# Patient Record
Sex: FEMALE | Race: BLACK OR AFRICAN AMERICAN | NOT HISPANIC OR LATINO | Employment: UNEMPLOYED
[De-identification: names, ages, dates, MRNs, and addresses within clinical notes are randomized per-mention and may not be internally consistent; named-entity substitution may affect disease eponyms.]

---

## 2017-12-17 ENCOUNTER — HEALTH MAINTENANCE LETTER (OUTPATIENT)
Age: 57
End: 2017-12-17

## 2023-11-02 ENCOUNTER — TRANSFERRED RECORDS (OUTPATIENT)
Dept: HEALTH INFORMATION MANAGEMENT | Facility: CLINIC | Age: 63
End: 2023-11-02
Payer: MEDICAID

## 2023-11-16 ENCOUNTER — PATIENT OUTREACH (OUTPATIENT)
Dept: CARE COORDINATION | Facility: CLINIC | Age: 63
End: 2023-11-16

## 2023-11-16 ENCOUNTER — OFFICE VISIT (OUTPATIENT)
Dept: FAMILY MEDICINE | Facility: CLINIC | Age: 63
End: 2023-11-16
Payer: MEDICAID

## 2023-11-16 VITALS
BODY MASS INDEX: 39.89 KG/M2 | TEMPERATURE: 98.1 F | OXYGEN SATURATION: 99 % | DIASTOLIC BLOOD PRESSURE: 60 MMHG | WEIGHT: 254.12 LBS | RESPIRATION RATE: 16 BRPM | SYSTOLIC BLOOD PRESSURE: 120 MMHG | HEIGHT: 67 IN | HEART RATE: 69 BPM

## 2023-11-16 DIAGNOSIS — N39.46 MIXED STRESS AND URGE URINARY INCONTINENCE: ICD-10-CM

## 2023-11-16 DIAGNOSIS — I10 ESSENTIAL HYPERTENSION: Primary | ICD-10-CM

## 2023-11-16 DIAGNOSIS — N18.32 STAGE 3B CHRONIC KIDNEY DISEASE (CKD) (H): ICD-10-CM

## 2023-11-16 DIAGNOSIS — Z11.4 SCREENING FOR HIV (HUMAN IMMUNODEFICIENCY VIRUS): ICD-10-CM

## 2023-11-16 DIAGNOSIS — M17.0 ARTHRITIS OF BOTH KNEES: ICD-10-CM

## 2023-11-16 DIAGNOSIS — Z13.9 ENCOUNTER FOR SCREENING INVOLVING SOCIAL DETERMINANTS OF HEALTH (SDOH): ICD-10-CM

## 2023-11-16 DIAGNOSIS — N95.0 POSTMENOPAUSAL BLEEDING: ICD-10-CM

## 2023-11-16 DIAGNOSIS — E66.01 CLASS 3 SEVERE OBESITY DUE TO EXCESS CALORIES WITHOUT SERIOUS COMORBIDITY WITH BODY MASS INDEX (BMI) OF 40.0 TO 44.9 IN ADULT (H): ICD-10-CM

## 2023-11-16 DIAGNOSIS — E66.813 CLASS 3 SEVERE OBESITY DUE TO EXCESS CALORIES WITHOUT SERIOUS COMORBIDITY WITH BODY MASS INDEX (BMI) OF 40.0 TO 44.9 IN ADULT (H): ICD-10-CM

## 2023-11-16 DIAGNOSIS — Z23 ENCOUNTER FOR IMMUNIZATION: ICD-10-CM

## 2023-11-16 DIAGNOSIS — Z86.73 HISTORY OF STROKE: ICD-10-CM

## 2023-11-16 LAB
ALBUMIN MFR UR ELPH: 25 MG/DL
ALBUMIN SERPL BCG-MCNC: 3.8 G/DL (ref 3.5–5.2)
ALBUMIN UR-MCNC: 30 MG/DL
ALP SERPL-CCNC: 95 U/L (ref 40–150)
ALT SERPL W P-5'-P-CCNC: 10 U/L (ref 0–50)
ANION GAP SERPL CALCULATED.3IONS-SCNC: 14 MMOL/L (ref 7–15)
APPEARANCE UR: CLEAR
AST SERPL W P-5'-P-CCNC: 14 U/L (ref 0–45)
BACTERIA #/AREA URNS HPF: ABNORMAL /HPF
BILIRUB SERPL-MCNC: 0.6 MG/DL
BILIRUB UR QL STRIP: NEGATIVE
BUN SERPL-MCNC: 30.4 MG/DL (ref 8–23)
CALCIUM SERPL-MCNC: 9.4 MG/DL (ref 8.8–10.2)
CHLORIDE SERPL-SCNC: 103 MMOL/L (ref 98–107)
CHOLEST SERPL-MCNC: 161 MG/DL
COLOR UR AUTO: YELLOW
CREAT SERPL-MCNC: 1.95 MG/DL (ref 0.51–0.95)
CREAT UR-MCNC: 131 MG/DL
CREAT UR-MCNC: 131 MG/DL
DEPRECATED HCO3 PLAS-SCNC: 22 MMOL/L (ref 22–29)
EGFRCR SERPLBLD CKD-EPI 2021: 28 ML/MIN/1.73M2
ERYTHROCYTE [DISTWIDTH] IN BLOOD BY AUTOMATED COUNT: 12.5 % (ref 10–15)
FERRITIN SERPL-MCNC: 138 NG/ML (ref 11–328)
GLUCOSE SERPL-MCNC: 118 MG/DL (ref 70–99)
GLUCOSE UR STRIP-MCNC: NEGATIVE MG/DL
HBA1C MFR BLD: 5.9 % (ref 0–5.6)
HCT VFR BLD AUTO: 37.3 % (ref 35–47)
HDLC SERPL-MCNC: 43 MG/DL
HGB BLD-MCNC: 12.3 G/DL (ref 11.7–15.7)
HGB UR QL STRIP: ABNORMAL
HOLD SPECIMEN: NORMAL
IRON BINDING CAPACITY (ROCHE): 239 UG/DL (ref 240–430)
IRON SATN MFR SERPL: 21 % (ref 15–46)
IRON SERPL-MCNC: 50 UG/DL (ref 37–145)
KETONES UR STRIP-MCNC: NEGATIVE MG/DL
LDLC SERPL CALC-MCNC: 101 MG/DL
LEUKOCYTE ESTERASE UR QL STRIP: ABNORMAL
MCH RBC QN AUTO: 29.4 PG (ref 26.5–33)
MCHC RBC AUTO-ENTMCNC: 33 G/DL (ref 31.5–36.5)
MCV RBC AUTO: 89 FL (ref 78–100)
MICROALBUMIN UR-MCNC: 88.7 MG/L
MICROALBUMIN/CREAT UR: 67.71 MG/G CR (ref 0–25)
MUCOUS THREADS #/AREA URNS LPF: PRESENT /LPF
NITRATE UR QL: NEGATIVE
NONHDLC SERPL-MCNC: 118 MG/DL
PH UR STRIP: 5.5 [PH] (ref 5–7)
PLATELET # BLD AUTO: 259 10E3/UL (ref 150–450)
POTASSIUM SERPL-SCNC: 3.9 MMOL/L (ref 3.4–5.3)
PROT SERPL-MCNC: 7.4 G/DL (ref 6.4–8.3)
PROT/CREAT 24H UR: 0.19 MG/MG CR (ref 0–0.2)
RBC # BLD AUTO: 4.19 10E6/UL (ref 3.8–5.2)
RBC #/AREA URNS AUTO: ABNORMAL /HPF
SODIUM SERPL-SCNC: 139 MMOL/L (ref 135–145)
SP GR UR STRIP: 1.01 (ref 1–1.03)
SQUAMOUS #/AREA URNS AUTO: ABNORMAL /LPF
TRIGL SERPL-MCNC: 86 MG/DL
UROBILINOGEN UR STRIP-ACNC: 0.2 E.U./DL
WBC # BLD AUTO: 5.6 10E3/UL (ref 4–11)
WBC #/AREA URNS AUTO: ABNORMAL /HPF
WBC CLUMPS #/AREA URNS HPF: PRESENT /HPF

## 2023-11-16 PROCEDURE — 80061 LIPID PANEL: CPT | Performed by: FAMILY MEDICINE

## 2023-11-16 PROCEDURE — 83970 ASSAY OF PARATHORMONE: CPT | Performed by: FAMILY MEDICINE

## 2023-11-16 PROCEDURE — 83540 ASSAY OF IRON: CPT | Performed by: FAMILY MEDICINE

## 2023-11-16 PROCEDURE — 82728 ASSAY OF FERRITIN: CPT | Performed by: FAMILY MEDICINE

## 2023-11-16 PROCEDURE — 82043 UR ALBUMIN QUANTITATIVE: CPT | Performed by: FAMILY MEDICINE

## 2023-11-16 PROCEDURE — 81001 URINALYSIS AUTO W/SCOPE: CPT | Performed by: FAMILY MEDICINE

## 2023-11-16 PROCEDURE — 99204 OFFICE O/P NEW MOD 45 MIN: CPT | Mod: 25 | Performed by: FAMILY MEDICINE

## 2023-11-16 PROCEDURE — 83550 IRON BINDING TEST: CPT | Performed by: FAMILY MEDICINE

## 2023-11-16 PROCEDURE — 82306 VITAMIN D 25 HYDROXY: CPT | Performed by: FAMILY MEDICINE

## 2023-11-16 PROCEDURE — 82570 ASSAY OF URINE CREATININE: CPT | Performed by: FAMILY MEDICINE

## 2023-11-16 PROCEDURE — 90471 IMMUNIZATION ADMIN: CPT | Performed by: FAMILY MEDICINE

## 2023-11-16 PROCEDURE — 84156 ASSAY OF PROTEIN URINE: CPT | Performed by: FAMILY MEDICINE

## 2023-11-16 PROCEDURE — 83735 ASSAY OF MAGNESIUM: CPT | Performed by: FAMILY MEDICINE

## 2023-11-16 PROCEDURE — 90662 IIV NO PRSV INCREASED AG IM: CPT | Performed by: FAMILY MEDICINE

## 2023-11-16 PROCEDURE — 85027 COMPLETE CBC AUTOMATED: CPT | Performed by: FAMILY MEDICINE

## 2023-11-16 PROCEDURE — 36415 COLL VENOUS BLD VENIPUNCTURE: CPT | Performed by: FAMILY MEDICINE

## 2023-11-16 PROCEDURE — 80053 COMPREHEN METABOLIC PANEL: CPT | Performed by: FAMILY MEDICINE

## 2023-11-16 PROCEDURE — 83036 HEMOGLOBIN GLYCOSYLATED A1C: CPT | Performed by: FAMILY MEDICINE

## 2023-11-16 PROCEDURE — 84100 ASSAY OF PHOSPHORUS: CPT | Performed by: FAMILY MEDICINE

## 2023-11-16 RX ORDER — ACETAMINOPHEN AND CODEINE PHOSPHATE 120; 12 MG/5ML; MG/5ML
1 SOLUTION ORAL
COMMUNITY
Start: 2023-10-26 | End: 2023-12-21

## 2023-11-16 RX ORDER — LOSARTAN POTASSIUM 25 MG/1
50 TABLET ORAL DAILY
Qty: 90 TABLET | Refills: 1 | Status: SHIPPED | OUTPATIENT
Start: 2023-11-16 | End: 2023-12-18

## 2023-11-16 RX ORDER — CANDESARTAN CILEXETIL AND HYDROCHLOROTHIAZIDE 16; 12.5 MG/1; MG/1
2 TABLET ORAL DAILY
COMMUNITY
End: 2023-11-16

## 2023-11-16 RX ORDER — RESPIRATORY SYNCYTIAL VIRUS VACCINE 120MCG/0.5
0.5 KIT INTRAMUSCULAR ONCE
Qty: 1 EACH | Refills: 0 | Status: CANCELLED | OUTPATIENT
Start: 2023-11-16 | End: 2023-11-16

## 2023-11-16 NOTE — COMMUNITY RESOURCES LIST (ENGLISH)
11/16/2023   [facility]  N/A  For questions about this resource list or additional care needs, please contact your primary care clinic or care manager.  Phone: [phone]   Email: N/A   Address: [address] [place] [address]   Hours: N/A        Financial Stability       Rent and mortgage payment assistance  1  [employer] (CLUES) [facility] - Rent Assistance Hotline Distance: 1.22 miles      Phone/Virtual   [address] [place] [address]  Language: English, Hebrew  Hours: Mon - Fri 8:30 AM - 5:00 PM   Phone: (105) 314-8656 Email: info@[employer].org Website: http://www.[employer].org/     2  [facility] Distance: 1.26 miles      In-Person   [address] [place] [address]  Language: English  Hours: Mon - Fri 8:00 AM - 3:00 PM  Fees: Free   Phone: (638) 825-3781 Ext.14 Email: neighborhood@[facility] Website: http://www.[facility].org          Hotlines and Helplines       Hotline - Housing crisis  3  [facility] Distance: 0.83 miles      Phone/Virtual   [address] [place] [address]  Language: English  Hours: Mon - Sun Open 24 Hours   Phone: (683) 799-5774 Email: communications@[facility].org Website: https://[facility]-mn.org/oursaviourshousing/     4  [facility] Distance: 1.56 miles      Phone/Virtual   [address] [place] [address]  Language: English  Hours: Mon - Sun Open 24 Hours  Fees: Free   Phone: (962) 588-2404 Email: info@saintola.org Website: http://www.saintola.org/          Housing       Coordinated Entry access point  5  [facility] [employer] [facility] [place] ([place] [facility] - Housing Services Distance: 1.02 miles      In-Person   [address] [place] [address]  Language: English  Hours: Mon - Fri 9:00 AM - 5:00 PM  Fees: Free   Phone: (340) 438-3834 Email: housing@[facility].org Website: http://www.[facility].org/housing     6  [facility] - Adult Shelter Connect  [facility] Distance: 1.56 miles      In-Person   [address] [place] [address]  Language: English  Hours: Mon - Sat 10:00 PM - 5:00 PM  Fees: Free   Phone: (675) 844-8270 Email: info@[employer]on[employer].com Website: http://www.saintolafVonvo.com/     Drop-in center or day shelter  7  [facility] Distance: 0.96 miles      In-Person   [address], [place] [address]  Language: English, Belizean, Slovenian  Hours: Mon - Sat 7:00 AM - 3:00 PM  Fees: Free, Self Pay   Phone: (475) 852-3592 Email: info@[employer] Website: https://www.[employer]/locations/opportunity-center/     8  [facility] Distance: 1.15 miles      In-Person   [address] [place] [address]  Language: English  Hours: Mon - Fri 12:00 PM - 3:00 PM  Fees: Free   Phone: (408) 640-9632 Email: [facility]@[employer] Website: http://[facility].Simplify/     Housing search assistance  9  [facility] [employer] [facility] ([facility]) Distance: 0.45 miles      In-Person   [address] [place] [address]  Language: English, Belizean, Slovenian  Hours: Mon - Fri 8:30 AM - 4:30 PM  Fees: Free   Phone: (792) 963-8230 Email: luisa@[facility]mn.org Website: http://www.[facility]-mn.org/     10  [facility]  - Office of Multicultural Services Distance: 0.89 miles      Phone/Virtual   [address] [place] [address]  Language: American Sign Language, Kiswahili, Indonesian, English, Ukrainian, Portuguese, Oromo, Cambodian, Belizean, Slovenian, Swahili, Gibraltarian, Haitian  Hours: Mon - Tue 9:00 AM - 4:00 PM , Wed 10:00 AM - 5:00 PM , Thu - Fri 9:00 AM - 4:00 PM  Fees: Free   Phone: (414) 298-4410 Email: david@[employer]. Website: http://www.[employer]./residents/human-services/multi-cultural-services     Shelter for families  11  [facility] Distance: 18.35 miles      In-Person   [address] [facility] MIA Jenkins [address]  Language: English   Hours: Mon - Fri 3:00 PM - 9:00 AM , Sat - Sun Open 24 Hours  Fees: Free   Phone: (795) 811-6243 Ext.1 Website: https://www.[facility]rews.[employer]/2020/07/03/emergency-family-shelter/     Shelter for individuals  12  [facility] Distance: 0.83 miles      In-Person   [address] [place] [address]  Language: English  Hours: Mon - Sun Open 24 Hours  Fees: Free   Phone: (444) 831-8541 Email: communications@[facility].org Website: https://[facility] Biologicsmn.org/oursaviourshousing/     13  [facility] Distance: 2.09 miles      In-Person   [address] [place] [address]  Language: English  Hours: Mon - Fri 4:00 PM - 9:00 AM  Fees: Free   Phone: (182) 246-3626 Email: adriana@[facility].org Website: https://centralRUST.[facility].org/[facility] Hospital[facility]/          Important Numbers & Websites       Emergency Services   [address]  [place] [address]  Poison Control   (392) 798-1486  Suicide Prevention Lifeline   (594) 347-9344 (TALK)  Child Abuse Hotline   (995) 625-4025 (4-A-Child)  Sexual Assault Hotline   (430) 424-8450 (HOPE)  National Runaway Safeline   (539) 998-3505 (RUNAWAY)  All-Options Talkline   (377) 322-5048  Substance Abuse Referral   (747) 513-7603 (HELP)

## 2023-11-16 NOTE — COMMUNITY RESOURCES LIST (PATIENT PREFERRED LANGUAGE)
11/16/2023   [facility]  N/A  Marta jimenezyesicamargy hillary wolfe, zohreh la ananddarcycady tonia wolfe ama roro thorpe.  Phone: [phone]   Email: N/A   Address: [address] [place] [address]   Hours: N/A        Greg hawkins  1  [employer] Latinas Unidas En Servicio (CLUES) - [place] - Shakila Wong Fogaanta: 18.35 miles      Telefoon/Virtual   [address] [place] [address]  Luuqad: Stephanie Fountain: Diane Holman 8:30 AM - 5:00 PM   Phone: (309) [phone] Email: info@clues.org Website: http://www.clues.org/     2  Milagros Cheema Fogaanta: 1.22 miles      Qof ahaan   [address] [place] [address]  Luuqad: Essie Villaseñor: Diane Holman 8:00 AM - 3:00 PM  Anu: Marilee   Phone: (294) [phone] Ext.14 Email: neighborhood@[facility].org Website: http://www.[facility].org          Shakila Aleman - Dhibaatada guriyeynta  3  Martinyga Maryellen - Shakila wolfe - Dhibaatada guriyaynta Fogaanta: 0.83 miles      Telefoon/Virtual   [address] [place] [address]  Luuqad: Essie Villaseñor: Diane Cordoba Axgeovanni Furan 24 Saa   Phone: ([phone]) [phone] Email: communications@[facility]-mn.org Website: https://[facility]-mn.org/oursGood Samaritan Medical Centersing/     4  Milagros Eche[facility]ria [facility] Fogaanta: 1.26 miles      Telefoon/Virtual   [address] [place] [address]  Luuqad: Essie Villaseñor: Diane Birmingham 24 [facility]  Anu: Pallavi braxton   Phone: (407) [phone] Email: info@saintolaf.org Website: http://www.saintolaf.org/          Hattie jimenez [facility] Memorial Hospitaluxiconradoan  5  Ravi Dixon [facility] ([facility]) - Jamil GiordanoGoleta Valley Cottage Hospitala: 1.02 miles      [facility]   [address] [place] [address]  Luuqad:  Essie Chiudaha: Isniinta - Jimce 9:00 AM - 5:00 PM  Kharashyada: Bilaash   Phone: (420) [phone] Email: housing@[facility] Website: http://www.[facility].org/housing     6  Kaniisadda Katooliga [facility] - Xidhiidh Hoyga Dadka Waaweyn - Degmada Mercer Fogaanta: 1.56 miles      Qof ahaan   [address] [place] [address]  Luuqad: Essie Grantcadaha: Isniinta - Sabti 10:00 PM - 5:00 PM  Kharashyada: Bilaash   Phone: ([phone] Email: info@saintolaf.[employer] Website: http://www.saintolafBlueSnap/     Julio hand ama payalyga maalinta  7  Hayadaha Katooliga [facility] - Fatoumatanta Fursadaha Fogaanta: 0.96 miles      Qof ahaan   [address] [place] [address]  Luuqad: Essie, Jesúsnessabrittany, Soomaali  Saacadaha: Isniinta - Sabti 7:00 AM - 3:00 PM  Kharashyada: Bilaash, Is Bixinta   Phone: (151) [phone] Email: info@[facility].org Website: https://www.[facility].org/locations/opportunity-center/     8  Tonia Canseco Fogaanta: 1.15 miles      Qof ahaan   [address] [place] [address]  Luuqad: Mariois  Appledaha: Isniinta - Jimce 12:00 PM - 3:00 PM  Kharashyada: Bilaash   Phone: ([phone] Email: Arkami@[employer] Website: http://Arkami.org/     Darrel lyon  9  Genaroa Eliaumarcarlos enriquea Zack Georges ([facility]) Fogaanta: 0.45 miles      Qof ahaan   [address] [place] [address]  Luuqad: Essie, Stephanie, Soomaali  Charleen: Diane Holman 8:30 AM - 4:30 PM  Anu: Marilee   Phone: ([phone] Email: luisa@[facility].org Website: http://www.[facility].org/     10  [facility] Zaina Saira Fogaanta: 0.89 miles      Telefoon/Virtual   [address] [place] [address]  Luuqad: Fran, Charissa, Kely, Essie, Stephanie, John, Luqdelfinoa Dandre Georges, Oromo, Ruush, Sawaaxili, Soomaali, Cyril, French  Charleen: Diane Heller  9:00 AM - 4:00 PM , Arbacada 10:00 AM - 5:00 PM , Khamiista - Jimce 9:00 AM - 4:00 PM  Kharashyada: Bilaash   Phone: (649) [phone] Email: oms@[employer]. Website: http://www.[facility]/residents/human-services/multi-cultural-services     Hoyga qoysaska  11  Hoyga Qoyska [facility] Fogaanta: 2.09 miles      Qof ahaan   [address] [place] [address]  Luuqad: Ingiriis  Saacadaha: Isniinta - Jimce 3:00 PM - 9:00 AM , Sabti - Axad Furan [address] [facility]  Kharashyada: Bilaash   Phone: (247) [phone] Ext.1 Website: https://www.saintandrews.org/2020/07/03/emergency-family-shelter/     Vandalidya margaret  12  Hoyga Badbaadiyahayaga Fogaanta: 0.83 miles      Qof ahaan   [address] [place] [address]  Luuqad: Ingiriis  Saacadaha: Isniinta - Axad Furan 24 [facility]  Kharashyada: Bilaash   Phone: [phone] Email: communications@[facility].org Website: https://hospitals-[place].org/oursaviourshousing/     13  [facility] Misty Detorya Fogaanta: 1.56 miles      Qof ahaan   [address] [place] [address]  Luuqad: Ingiriis  Saacadaha: Isniinta - Jimce 4:00 PM - 9:00 AM  Kharashyada: Bilaash   Phone: [phone] Email: adriana@[facility]salvationarmy.org Website: https://centralusa.salvationarmy.org/[facility]/          Piero Harris  & Mita       Adeantoninada Rohan   911  Adeegyada Magaalada   [address]  Arcelia Stevens   [phone]  Lifeline ka horbarbaralidya hermila   [phone] (TALK)  Shakila Combs   [phone] (4-A-Child)  Shakila Pizarro   [phone] (HOPE)  National Runaway Safeline   [phone] [phone] (RUNAWAY)  Shakila Estrada   [phone]  Belem Younger   [phone] (HELP)

## 2023-11-16 NOTE — PROGRESS NOTES
Assessment & Plan     62yo female here to re-establish care with history of CKD stage 3b, hypertension, reported history of stroke, obesity, postmenopausal bleeding undergoing workup.    Essential hypertension  BP at goal today, but having urinary incontinence while on hydrochlorothiazide, so will stop candesartan-hydrochlorothiazide (16-12.5mg daily) and start losartan. Follow up in 1mo for BP check with RN.  - losartan (COZAAR) 25 MG tablet  Dispense: 90 tablet; Refill: 1    Mixed stress and urge urinary incontinence  Has both stress and urge incontinence. Check UA to rule out UTI. Start pelvic floor PT.  - Physical Therapy Referral  - UA Macroscopic with reflex to Microscopic and Culture - Lab Collect  - UA Macroscopic with reflex to Microscopic and Culture - Lab Collect  - UA Microscopic with Reflex to Culture    Stage 3b chronic kidney disease (CKD) (H)  Has not had labs checked in our system for years, will check those ordered below.  - Lipid panel reflex to direct LDL Non-fasting  - Albumin Random Urine Quantitative with Creat Ratio  - Comprehensive metabolic panel (BMP + Alb, Alk Phos, ALT, AST, Total. Bili, TP)  - Protein  random urine  - CBC with Platelets and Reflex to Iron Studies  - Hemoglobin A1c  - Lipid panel reflex to direct LDL Non-fasting  - Comprehensive metabolic panel (BMP + Alb, Alk Phos, ALT, AST, Total. Bili, TP)  - CBC with Platelets and Reflex to Iron Studies  - Hemoglobin A1c  - Iron & Iron Binding Capacity  - Ferritin  - Albumin Random Urine Quantitative with Creat Ratio  - Protein  random urine    Postmenopausal bleeding  Started to have postmenopausal bleeding while still in [facility] and was evaluated there and thought to have endocervical cancer. [place] an since then has had preliminary workup that has showed no malignancy. She was given provera and is now taking norethindrone with resolution of bleeding. Awaiting further workup, held up by insurance issues  "(referred to [facility] today to assist). Requests refills of norethindrone but has pharmacy slip which shows she has 3+ refills available. Advised to call to refill and if any issues, can call and we can send a new prescription.    History of stroke  Reports a history of stroke in approximately 2005 with no residual effects. History is not completely consistent with stroke. Given recent bleeding, will hold off on aspirin for now.    Encounter for screening involving social determinants of health (SDoH)  [place] with concerns about [place] coverage while undergoing workup for possible malignancy. Family would also like help with housing applications.  - Primary Care - Care Coordination Referral    Class 3 severe obesity due to excess calories without serious comorbidity with body mass index (BMI) of 40.0 to 44.9 in adult (H)  Losing weight will help with incontinence, will continue to discuss with patient at next visit.    Screening for HIV (human immunodeficiency virus)  Declines screening today.    Encounter for immunization  - INFLUENZA VACCINE 65+ (FLUZONE HD)  - Declines COVID vaccine    Arthritis of both knees  Mild pain due to this, reviewed using tylenol and not NSAIDs which she is doing.      45 minutes spent by me on the date of the encounter doing chart review, history and exam, documentation and further activities per the note       BMI:   Estimated body mass index is 40.12 kg/m  as calculated from the following:    Height as of this encounter: 1.695 m (5' 6.73\").    Weight as of this encounter: 115.3 kg (254 lb 1.9 oz).           Ratna Pham MD  [facility] REBECCA Morelos is a 63 year old, presenting for the following health issues:  f/u creatinine level , Hypertension, Arthritis, and Incontinence (Urinary incontinence due to meds )        11/16/2023     9:30 AM   Additional Questions   Roomed by benedict schulte   Accompanied by son  daria     Declined " ", states comfortable in English    History of Present Illness       Reason for visit:  Medical check up    She eats 0-1 servings of fruits and vegetables daily.She consumes 2 sweetened beverage(s) daily.She exercises with enough effort to increase her heart rate 9 or less minutes per day.  She exercises with enough effort to increase her heart rate 3 or less days per week.   She is taking medications regularly.    Postmenopausal bleeding  - saw [place]  - given provera, stopped bleeding  - did biopsy and MRI and couldn't find cancer  - suggested a second biopsy, and another test; but says it is highly unlikely that she has cancer, wants to figure out what is causing the bleeding  - waiting for Medicare plan B to be approved - would like assistance with insurance  - last took norethindrone 0.35mg 3 days ago    Creatinine/CKD  Urinary incontinence  - avoids drinking water because it causes incontinence  - would prefer a blood pressure medication that doesn't cause frequent urination; interested in losartan  - she is aware of urge to go to bathroom, but can't make it in time  - wears diapers, insurance does not cover  - leaks urine when she sneezes  - no constipation, sometimes has diarrhea/loose stools    Arthritis  - only uses tylenol    Minor stroke a couple of years ago  - feels like muscles weakened after that  - in [place] (?2005), slept and wasn't able to move right arm and leg, walked to hospital; blood pressure was very high at that time, did recommend taking aspirin but not taking currently; nothing like that since; no problems talking or swallowing  - went to [place] after this occurred              Review of Systems   Musculoskeletal:  Positive for arthritis.      Constitutional, HEENT, cardiovascular, pulmonary, gi and gu systems are negative, except as otherwise noted.      Objective    /60   Pulse 69   Temp 98.1  F (36.7  C) (Oral)   Resp 16   Ht 1.695 m (5' 6.73\")   Wt 115.3 kg " (254 lb 1.9 oz)   SpO2 99%   BMI 40.12 kg/m    Body mass index is 40.12 kg/m .  Physical Exam   General: well-appearing, no acute distress  HEENT: normocephalic, atraumatic, mucous membranes moist  Eyes: conjunctivae normal  Neck: normal ROM, supple  Cardiovascular: regular rate and rhythm, normal S1 and S2, no murmurs/rubs/gallops, no peripheral edema  Pulmonary: no increased work of breathing, clear to auscultation bilaterally, no wheezes/rales/rhonchi  Musculoskeletal: normal ROM, no deformity  Neurological: gross motor exam normal, no facial asymmetry  Skin: no rashes or lesions

## 2023-11-16 NOTE — PROGRESS NOTES
Clinic Care Coordination Contact  Community Health Worker Initial Outreach    CHW Initial Information Gathering:  Referral Source: PCP  Preferred Hospital: [facility]  [phone]  Preferred Urgent Care: [facility], [phone]  Current living arrangement:: I live in a private home with family  Type of residence:: Apartment  Community Resources: None  Supplies Currently Used at Home: None  Equipment Currently Used at Home: none  Informal Support system:: Family  No PCP office visit in Past Year: No  Transportation means:: Family  CHW Additional Questions  If ED/Hospital discharge, follow-up appointment scheduled as recommended?: N/A  Medication changes made following ED/Hospital discharge?: N/A  MyChart active?: No  Patient agreeable to assistance with activating MyChart?: No    Patient accepts CC: Yes. Patient scheduled for assessment with CCC LINDSAY on 11/29/2023 at 11:00 AM. Patient noted desire to discuss 62yo female who was in [place] for past 5 years, got diagnosis of endocervical cancer there, has seen oncologist here and so far workup negative but stalled due to insurance issues (Medicare part B pending), would appreciate assistance. Also housing.     Financial Resource Worker Screening    [facility] Benefits  Is patient requesting help applying for [facility] benefits?: No    Insurance:  [place] Yes  Is this an insurance renewal?: No  Is this a new insurance application request?: Yes  Have you recently applied for insurance?: No  How many people in your household? : 1  Do you file taxes?: No  What is the monthly gross income for the household (wages, social security, workers comp, and pension)? : 800    Care Coordination team will tell patient:   Thank you for answering all the questions, based on screening questions, our Financial Resource Worker will reach out to you with additional questions and next steps.

## 2023-11-17 ENCOUNTER — TELEPHONE (OUTPATIENT)
Dept: FAMILY MEDICINE | Facility: CLINIC | Age: 63
End: 2023-11-17
Payer: MEDICAID

## 2023-11-17 DIAGNOSIS — N18.32 STAGE 3B CHRONIC KIDNEY DISEASE (CKD) (H): Primary | ICD-10-CM

## 2023-11-17 DIAGNOSIS — R82.90 ABNORMAL URINALYSIS: ICD-10-CM

## 2023-11-17 LAB
MAGNESIUM SERPL-MCNC: 2.1 MG/DL (ref 1.7–2.3)
PHOSPHATE SERPL-MCNC: 3.5 MG/DL (ref 2.5–4.5)
PTH-INTACT SERPL-MCNC: 168 PG/ML (ref 15–65)
VIT D+METAB SERPL-MCNC: 20 NG/ML (ref 20–50)

## 2023-11-17 NOTE — TELEPHONE ENCOUNTER
Called pt and relayed lab result. Pt verbalized understanding and lab appt scheduled same day as BP check.      Duarte Hannon Cem Say, BSN RN  [facility]        ----- Message from Ratna Pham MD sent at 11/17/2023  1:47 PM CST -----  RN team - please call patient with results. Her labs show that her kidney function is slightly worsened compared to what it was when we last checked in our system 9 years ago. We should recheck her kidney function when she comes in on 12/18 for her BP check. I put in the orders for labs, please assist in scheduling a lab only appointment at that time.  Her A1c shows that she has prediabetes, or is at an increased risk of developing diabetes but does not have diabetes yet.  Finally, her urine studies show that she might have a urinary tract infection. I will wait to see what the results of the urine culture are before prescribing an antibiotic.  Thanks!

## 2023-11-20 ENCOUNTER — PATIENT OUTREACH (OUTPATIENT)
Dept: CARE COORDINATION | Facility: CLINIC | Age: 63
End: 2023-11-20
Payer: MEDICAID

## 2023-11-20 NOTE — PROGRESS NOTES
Clinic Care Coordination Contact  Program: insurance   County:[facility] Case #:  [facility] Worker:   [place] #:   Subscriber #:   Renewal:  Date Applied:     GAMALIEL Outreach:   11/20/23 FRW called and patient stated that she just got back [place] the country from [place] and FRW filled out a paper application and mailed it to the patient for her to sign and send in. FRW will follow up in 30 days   Bette Willett   Financial Resource Worker  [facility]  Clinic Care Coordination  [phone]     Health Insurance Screening: ALDAIR   1. Do you currently have health insurance, did you receive a renewal? no  2. If you applied through [employer], do you know your username/password? No  3. How many people in the household? 1  4. Do you file taxes, who do you file with?  No   5. What is your monthly income (include all tax members)? 830. SSI  6. Do you have access to insurance through an employer (if yes, need EIN)? No   7. Do you have social security cards and/or green cards?  Yes     Health Insurance:      Referral/Screening:   W Screening    Row Name 11/16/23 1445       [facility] Benefits   Is patient requesting help applying for [facility] benefits? No       Insurance:   [place]? Yes       Is this an insurance renewal? No       Is this a new insurance application request? Yes       Have you recently applied for insurance? No       How many people in your household? 1       Do you file taxes? No       What is the monthly gross income for the household (wages, social security, workers comp, and pension)? 800       OTHER   Is this a elvia care application? No

## 2023-11-29 ENCOUNTER — PATIENT OUTREACH (OUTPATIENT)
Dept: NURSING | Facility: CLINIC | Age: 63
End: 2023-11-29
Payer: MEDICAID

## 2023-11-29 NOTE — PROGRESS NOTES
Clinic Care Coordination Contact  [facility]/Voicemail    Clinical Data: Care Coordinator Outreach    Outreach Documentation Number of Outreach Attempt   11/29/2023  11:16 AM 1       Left message on patient's voicemail with call back information and requested return call.    Plan: Care Coordinator will send unable to contact letter with care coordinator contact information via mail. Care Coordinator will try to reach patient again in 1-2 business days.    MICHAEL Bahena, [facility]  Social Work Care Coordinator

## 2023-11-29 NOTE — LETTER
[facility] C[facility] COORDINATION  [facility]  November 29, 2023    Jethro Wallace  [address]   [facility] [address]      Dear Jethro,    I am a clinic care coordinator who works with Ratna Pham MD with the [facility]. I have been trying to reach you recently to introduce Clinic Care Coordination. Below is a description of clinic care coordination and how I can further assist you.       The clinic care coordination team is made up of a registered nurse, , financial resource worker and community health worker who understand the health care system. The goal of clinic care coordination is to help you manage your health and improve access to the health care system. Our team works alongside your provider to assist you in determining your health and social needs. We can help you obtain health care and community resources, providing you with necessary information and education. We can work with you through any barriers and develop a care plan that helps coordinate and strengthen the communication between you and your care team.  Our services are voluntary and are offered without charge to you personally.    Please feel free to contact me with any questions or concerns regarding care coordination and what we can offer.      We are focused on providing you with the highest-quality healthcare experience possible.    Sincerely,     Suzie Cedeño, MICHAEL, [facility]  Social Work Care Coordinator

## 2023-12-04 ENCOUNTER — NURSE TRIAGE (OUTPATIENT)
Dept: NURSING | Facility: CLINIC | Age: 63
End: 2023-12-04
Payer: MEDICAID

## 2023-12-04 NOTE — TELEPHONE ENCOUNTER
Son is the caller and [facility] advised that he is to pick a plan.  No PHI information given to pt re:  Mom.  [facility] Financial Assistance number given to Son.  Elke Calvin RN  [facility] Nurse Advisor

## 2023-12-05 ENCOUNTER — TELEPHONE (OUTPATIENT)
Dept: MAMMOGRAPHY | Facility: CLINIC | Age: 63
End: 2023-12-05
Payer: MEDICAID

## 2023-12-05 NOTE — TELEPHONE ENCOUNTER
Patient Quality Outreach    Patient is due for the following:   Breast Cancer Screening - Mammogram    Next Steps:   Patient was scheduled for mammo    Type of outreach:    Phone, spoke to patient/parent. 12/13 at 1:00    Next Steps:  Reach out within 90 days via Phone.    Max number of attempts reached: No. Will try again in 90 days if patient still on fail list.    Questions for provider review:    None           AGNIESZKA Jose  Chart routed to Care Team.

## 2023-12-13 ENCOUNTER — PATIENT OUTREACH (OUTPATIENT)
Dept: CARE COORDINATION | Facility: CLINIC | Age: 63
End: 2023-12-13

## 2023-12-13 NOTE — LETTER
[facility] COORDIN[facility]  [facility]  December 13, 2023    Jethro Wallace  [address]   [place] [facility] [address]      Dear Jethro,    I am a clinic care coordinator who works with Ratna Pham MD with the [facility]. I have been trying to reach you recently to introduce Clinic Care Coordination. Below is a description of clinic care coordination and how I can further assist you.       The clinic care coordination team is made up of a registered nurse, , financial resource worker and community health worker who understand the health care system. The goal of clinic care coordination is to help you manage your health and improve access to the health care system. Our team works alongside your provider to assist you in determining your health and social needs. We can help you obtain health care and community resources, providing you with necessary information and education. We can work with you through any barriers and develop a care plan that helps coordinate and strengthen the communication between you and your care team.  Our services are voluntary and are offered without charge to you personally.    Please feel free to contact me with any questions or concerns regarding care coordination and what we can offer.      We are focused on providing you with the highest-quality healthcare experience possible.    Sincerely,     Suzie Cedeño, MICHAEL, [facility]  Social Work Care Coordinator

## 2023-12-13 NOTE — PROGRESS NOTES
Clinic Care Coordination Contact  [facility]/Voicemail    Clinical Data: Care Coordinator Outreach        Left message on patient's voicemail with call back information and requested return call.    Plan: Care Coordinator sent care coordination introduction letter on 12/13 via mail. Care Coordinator will try to reach patient again in 10 business days.    FRW has been able to reach pt. CCSW will not close pt out of CCC episode as FRW is assisting. CCSW will make 1 more attempt to reach pt.     Suzie Cedeño, MICHAEL, SW  Social Work Care Coordinator

## 2023-12-14 ENCOUNTER — TRANSFERRED RECORDS (OUTPATIENT)
Dept: HEALTH INFORMATION MANAGEMENT | Facility: CLINIC | Age: 63
End: 2023-12-14
Payer: MEDICAID

## 2023-12-18 ENCOUNTER — LAB (OUTPATIENT)
Dept: LAB | Facility: CLINIC | Age: 63
End: 2023-12-18
Payer: MEDICAID

## 2023-12-18 ENCOUNTER — ALLIED HEALTH/NURSE VISIT (OUTPATIENT)
Dept: FAMILY MEDICINE | Facility: CLINIC | Age: 63
End: 2023-12-18
Payer: MEDICAID

## 2023-12-18 VITALS — HEART RATE: 73 BPM | DIASTOLIC BLOOD PRESSURE: 93 MMHG | SYSTOLIC BLOOD PRESSURE: 152 MMHG

## 2023-12-18 DIAGNOSIS — I10 ESSENTIAL HYPERTENSION: ICD-10-CM

## 2023-12-18 DIAGNOSIS — R82.90 ABNORMAL URINALYSIS: ICD-10-CM

## 2023-12-18 DIAGNOSIS — I10 HYPERTENSION, UNSPECIFIED TYPE: Primary | ICD-10-CM

## 2023-12-18 DIAGNOSIS — N18.32 STAGE 3B CHRONIC KIDNEY DISEASE (CKD) (H): ICD-10-CM

## 2023-12-18 LAB
ANION GAP SERPL CALCULATED.3IONS-SCNC: 11 MMOL/L (ref 7–15)
BUN SERPL-MCNC: 22.3 MG/DL (ref 8–23)
CALCIUM SERPL-MCNC: 9.3 MG/DL (ref 8.8–10.2)
CHLORIDE SERPL-SCNC: 105 MMOL/L (ref 98–107)
CREAT SERPL-MCNC: 1.58 MG/DL (ref 0.51–0.95)
DEPRECATED HCO3 PLAS-SCNC: 22 MMOL/L (ref 22–29)
EGFRCR SERPLBLD CKD-EPI 2021: 36 ML/MIN/1.73M2
GLUCOSE SERPL-MCNC: 99 MG/DL (ref 70–99)
POTASSIUM SERPL-SCNC: 4 MMOL/L (ref 3.4–5.3)
SODIUM SERPL-SCNC: 138 MMOL/L (ref 135–145)

## 2023-12-18 PROCEDURE — 80048 BASIC METABOLIC PNL TOTAL CA: CPT

## 2023-12-18 PROCEDURE — 36415 COLL VENOUS BLD VENIPUNCTURE: CPT

## 2023-12-18 PROCEDURE — 99207 PR NO CHARGE NURSE ONLY: CPT

## 2023-12-18 PROCEDURE — 87088 URINE BACTERIA CULTURE: CPT

## 2023-12-18 PROCEDURE — 87086 URINE CULTURE/COLONY COUNT: CPT

## 2023-12-18 RX ORDER — LOSARTAN POTASSIUM 100 MG/1
100 TABLET ORAL DAILY
Qty: 90 TABLET | Refills: 1 | Status: SHIPPED | OUTPATIENT
Start: 2023-12-18 | End: 2024-02-05

## 2023-12-18 NOTE — PROGRESS NOTES
I met with Jethro Wallace at the request of Dr. Marcano to recheck her blood pressure.  Blood pressure medications on the med list were reviewed with patient.    Patient has taken all medications as per usual regimen: Yes  Patient reports tolerating them without any issues or concerns: Yes    Vitals:    12/18/23 0957 12/18/23 1011   BP: (!) 144/74 (!) 152/93   BP Location: Left arm Left arm   Patient Position: Sitting Sitting   Cuff Size: Adult Large Adult Large   Pulse: 71 73       After 5 minutes, the patient's blood pressure remained greater than or equal to 140/90.    Is the patient currently having any chest pain? No  Does the patient currently have a headache? No  Does the patient currently have any vision changes? No  Does the patient currently have any nausea? No  Does the patient currently have any abdominal pain? No    The previous encounter was reviewed.  The patient was discharged and the note will be sent to the provider for final review.

## 2023-12-19 ENCOUNTER — TELEPHONE (OUTPATIENT)
Dept: FAMILY MEDICINE | Facility: CLINIC | Age: 63
End: 2023-12-19
Payer: MEDICAID

## 2023-12-19 LAB
BACTERIA UR CULT: ABNORMAL
BACTERIA UR CULT: ABNORMAL

## 2023-12-19 NOTE — TELEPHONE ENCOUNTER
----- Message from Ratna Pham MD sent at 12/18/2023 11:03 AM CST -----  Regarding: RE: RN BP check  Thank you! Would you be able to call patient and let her know that she should increase the dose of the medication to 100mg (4 of the 25mg tablets that she currently has at home)? I will send in a new prescription for the 100mg tablets. It looks like she actually has an appointment with me on 12/21 and I can discuss with her further then.  Thanks!  Luis Pham MD  Family Medicine with Obstetrics  [facility]  12/18/23  11:04 AM    ----- Message -----  From: Romana Lebron RN  Sent: 12/18/2023  10:22 AM CST  To: Ratna Pham MD  Subject: RN BP check                                      Patient came in today with son for RN BP check.     First reading was 144/74. After the 5 minutes wait, second reading was 152/93.     Patient taking all medications routinely and daily without any issues. Although today, patient mentioned have not taken BP medications this morning. Son mentions that in recent medical visits patient's been to, even after taking BP medications her BP has been in the 140s for systolic pressure.     Patient states not experiencing any symptoms today. Patient's chart reviewed and patient was discharged.     Provider please review and advise, thank you.           Romana Lebron, MSN, RN   [facility]

## 2023-12-19 NOTE — TELEPHONE ENCOUNTER
I reviewed this with pt.  Reviewed the new rx for losartan.  Confirmed the 12/21/23 appt, too.  WES Vargas

## 2023-12-20 ENCOUNTER — PATIENT OUTREACH (OUTPATIENT)
Dept: CARE COORDINATION | Facility: CLINIC | Age: 63
End: 2023-12-20
Payer: MEDICAID

## 2023-12-20 NOTE — PROGRESS NOTES
Clinic Care Coordination Contact  Program: insurance   [place] Case #:  County Worker:   [place] #:   Subscriber #:   Renewal:  Date Applied:     FRKARAN Outreach:   12/21/23  FRW called patient and left a vm with call back information. FRW will make outreach in one to two weeks.  Bette Willett   Financial Resource Worker  [facility] Care Coordination  [phone]    11/20/23 FRW called and patient stated that she just got back [place] the country from [place] and FRW filled out a paper application and mailed it to the patient for her to sign and send in. FRW will follow up in 30 days   Bette Willett   Financial Resource Worker  [facility] Care Coordination  [phone]     Health Insurance Screening: [place]   1. Do you currently have health insurance, did you receive a renewal? no  2. If you applied through Mnsure, do you know your username/password? No  3. How many people in the household? 1  4. Do you file taxes, who do you file with?  No   5. What is your monthly income (include all tax members)? 830. SSI  6. Do you have access to insurance through an employer (if yes, need EIN)? No   7. Do you have social security cards and/or green cards?  Yes     Health Insurance:      Referral/Screening:   GAMALIEL Screening    Row Name 11/16/23 1445       County Benefits   Is patient requesting help applying for [facility] benefits? No       Insurance:   [place]? Yes       Is this an insurance renewal? No       Is this a new insurance application request? Yes       Have you recently applied for insurance? No       How many people in your household? 1       Do you file taxes? No       What is the monthly gross income for the household (wages, social security, workers comp, and pension)? 800       OTHER   Is this a elvia care application? No

## 2023-12-21 ENCOUNTER — OFFICE VISIT (OUTPATIENT)
Dept: FAMILY MEDICINE | Facility: CLINIC | Age: 63
End: 2023-12-21
Payer: MEDICAID

## 2023-12-21 VITALS
HEIGHT: 68 IN | TEMPERATURE: 97.6 F | WEIGHT: 257.1 LBS | HEART RATE: 76 BPM | BODY MASS INDEX: 38.97 KG/M2 | SYSTOLIC BLOOD PRESSURE: 178 MMHG | OXYGEN SATURATION: 99 % | DIASTOLIC BLOOD PRESSURE: 98 MMHG

## 2023-12-21 DIAGNOSIS — R01.1 SYSTOLIC MURMUR: ICD-10-CM

## 2023-12-21 DIAGNOSIS — N18.32 STAGE 3B CHRONIC KIDNEY DISEASE (CKD) (H): ICD-10-CM

## 2023-12-21 DIAGNOSIS — Z01.818 PREOP GENERAL PHYSICAL EXAM: Primary | ICD-10-CM

## 2023-12-21 DIAGNOSIS — N95.0 POSTMENOPAUSAL BLEEDING: ICD-10-CM

## 2023-12-21 DIAGNOSIS — N39.46 MIXED STRESS AND URGE URINARY INCONTINENCE: ICD-10-CM

## 2023-12-21 DIAGNOSIS — N85.00 HYPERPLASIA OF ENDOMETRIUM DETERMINED BY BIOPSY: ICD-10-CM

## 2023-12-21 DIAGNOSIS — Z71.89 ADVANCED DIRECTIVES, COUNSELING/DISCUSSION: ICD-10-CM

## 2023-12-21 DIAGNOSIS — I10 HTN, GOAL BELOW 140/90: ICD-10-CM

## 2023-12-21 DIAGNOSIS — Z86.73 HISTORY OF STROKE: ICD-10-CM

## 2023-12-21 LAB
ERYTHROCYTE [DISTWIDTH] IN BLOOD BY AUTOMATED COUNT: 12.9 % (ref 10–15)
HCT VFR BLD AUTO: 37.7 % (ref 35–47)
HGB BLD-MCNC: 12.6 G/DL (ref 11.7–15.7)
HOLD SPECIMEN: NORMAL
IRON BINDING CAPACITY (ROCHE): 254 UG/DL (ref 240–430)
IRON SATN MFR SERPL: 21 % (ref 15–46)
IRON SERPL-MCNC: 53 UG/DL (ref 37–145)
MCH RBC QN AUTO: 29.1 PG (ref 26.5–33)
MCHC RBC AUTO-ENTMCNC: 33.4 G/DL (ref 31.5–36.5)
MCV RBC AUTO: 87 FL (ref 78–100)
PLATELET # BLD AUTO: 328 10E3/UL (ref 150–450)
RBC # BLD AUTO: 4.33 10E6/UL (ref 3.8–5.2)
WBC # BLD AUTO: 6.4 10E3/UL (ref 4–11)

## 2023-12-21 PROCEDURE — 82728 ASSAY OF FERRITIN: CPT | Performed by: FAMILY MEDICINE

## 2023-12-21 PROCEDURE — 83550 IRON BINDING TEST: CPT | Performed by: FAMILY MEDICINE

## 2023-12-21 PROCEDURE — 83540 ASSAY OF IRON: CPT | Performed by: FAMILY MEDICINE

## 2023-12-21 PROCEDURE — 93010 ELECTROCARDIOGRAM REPORT: CPT | Performed by: INTERNAL MEDICINE

## 2023-12-21 PROCEDURE — 99215 OFFICE O/P EST HI 40 MIN: CPT | Mod: 25 | Performed by: FAMILY MEDICINE

## 2023-12-21 PROCEDURE — 93005 ELECTROCARDIOGRAM TRACING: CPT | Performed by: FAMILY MEDICINE

## 2023-12-21 PROCEDURE — 36415 COLL VENOUS BLD VENIPUNCTURE: CPT | Performed by: FAMILY MEDICINE

## 2023-12-21 PROCEDURE — 85027 COMPLETE CBC AUTOMATED: CPT | Performed by: FAMILY MEDICINE

## 2023-12-21 RX ORDER — AMLODIPINE BESYLATE 5 MG/1
5 TABLET ORAL DAILY
Qty: 90 TABLET | Refills: 1 | Status: CANCELLED | OUTPATIENT
Start: 2023-12-21

## 2023-12-21 RX ORDER — RESPIRATORY SYNCYTIAL VIRUS VACCINE 120MCG/0.5
0.5 KIT INTRAMUSCULAR ONCE
Qty: 1 EACH | Refills: 0 | Status: CANCELLED | OUTPATIENT
Start: 2023-12-21 | End: 2023-12-21

## 2023-12-21 RX ORDER — AMLODIPINE BESYLATE 10 MG/1
10 TABLET ORAL DAILY
Qty: 90 TABLET | Refills: 1 | Status: SHIPPED | OUTPATIENT
Start: 2023-12-21 | End: 2024-02-05

## 2023-12-21 RX ORDER — OXYCODONE HYDROCHLORIDE 5 MG/1
5 TABLET ORAL EVERY 6 HOURS PRN
COMMUNITY
Start: 2023-12-07

## 2023-12-21 NOTE — COMMUNITY RESOURCES LIST (PATIENT PREFERRED LANGUAGE)
12/21/2023   [facility]  N/A  Marta mcmahon ama oleksandr parsons , zohreh españa  ama roro thorpe.  Phone: [phone]   Email: N/A   Address: [address] [place] [address]   Hours: N/A        Shakila Aleman iyo Kajaladakanea Caawinta       Shakila Aleman - Dhibaatada guriyeynta  1  Hoyga Maryellen - Shakila gonzaleza ah - Dhibaatada guriyaynta Fogaanta: 0.83 miles      Telefoon/Virtual   [address] [place] [address]  Luuqad: Essie Grantcadaha: Isniinta - Axad [address] [facility]   Phone: [phone] Email: communications@[facility].org Website: https://[facility].org/oursaviourshousing/     2  Kaniisadana [facility] Fogaanta: 1.56 miles      Telefoon/Virtual   [address] [place] [address]  Luuqad: Mariois  Appledaha: Isniinta - Axad [address] [facility]  Annieshyada: Pallavi braxton   Phone: [phone] Email: info@saintolaf.org Website: http://www.saintolaf.org/          Hattie jimenez [facility] Hospit[facility]-x[facility]han  3  Ravi Dixon [facility] ([facility]) - Adeegyada Guriyeynta Fogaanta: 1.02 miles      [facility]   [address] [place] [address]  Luuqad: Essie Villaseñor: Isniinta - Jimthor 9:00 AM - 5:00 PM  Kharashyada: Johnaakaykay   Phone: [phone] Email: housing@[email or web address].org Website: http://www.[facility].org/[facility]     4  Junioriisaandreya Tracia [facility] - Gailiijesus Burns - Fuentes Anton Fogaanta: 1.56 miles      Qof ahaan   [address] [place] [address]  Luuqad: Essie Villaseñor: Diane Wilkerson 10:00 PM - 5:00 PM  Jdda: Marilee   Phone: [phone] Email: info@[employer].org Website: http://www.saintolaf.org/     Julio baeza  5  Hay'adaha Katooliga [facility] - Julio Weiss Fogaanta: 0.96 miles      [facility] ahaan   [address] [place] [address]  Caylauqad: Essie  Isbaanish, Soomaali  Saacadaha: Isniinta - Sabti 7:00 AM - 3:00 PM  Kharashyada: BilaakaykayJesústa   Phone: [phone] Email: info@[employer] Website: https://www.[employer].org/locations/opportunity-center/     Linh Wudelfinolidya [place]oga[place]: 1.15 miles      Qof ahaan   [address] [place] [address]  Luuqad: Ingiriis  Saacadaha: Isniinta - Jimce 12:00 PM - 3:00 PM  Kharashyada: Bilaash   Phone: [phone] Email: [email or web address] Website: http://HomeCon.LocalLux/     Darrel lyon  7  Reandrey Eliaumarinta Bulshada Santos Georges ([facility]) Fogaanta: 0.45 miles      Qof ahaan   [address] [place] [address]  Luuqad: Ingiriis, Isbaanish, Soomaali  Saacadaha: Isniinta - Jimce 8:30 AM - 4:30 PM  Kharashyada: Bilaash   Phone: [phone] Email: [email or web address].org Website: http://www.[email or web address]-[place].org/     8  [facility] - [facility] Zita Chávez Fogaanta: 0.89 miles      Telefoon/Virtual   [address] [place] [address]  Luuqad: Amxaari, Carabi, Faransiis, Ingiriis, Isbaanish, Croatian, Luqadda Calaamadaha Maraykanka, Oromo, Ruush, Sawaaxili, Soomaali, Australian, Kyrgyz  Saacadaha: Isniinta - Talaado 9:00 AM - 4:00 PM , Arbacada 10:00 AM - 5:00 PM , Khamiista - Jimce 9:00 AM - 4:00 PM  Kharashyada: Bilaash   Phone: [phone] Email: oms@[email or web address]. Website: http://www.[facility]/residents/human-services/multi-cultural-services     David crabtree  9  David Rasmussen [facility]  Dain Fogaanta: 18.35 miles      [facility]   [address] [place] [address]  Vangieqad: Essie Villaseñor: Diane Holman 3:00 PM - 9:00 AM , Rock - Axgeovanni F[address] [facility]  Kajalcarlos manuelfededa: Johnjanellesh   Phone: [phone] Ext.1 Website: https://www.[facility]andrews.org/2020/07/03/emergency-family-shelter/     David robles  10  David Youssefyahayaga Fogaanta: 0.83 miles      of [facility]   [address] [place]  [address]  Luuqad: Miguelonelalistair Parraha: Isniinta - Axad F[address] [facility]  Khcarlos manuelshyada: Bilaash   Phone: [phone] Email: communications@[facility]-[place].org Website: https://[facility]-[place].org/oursaviourshousing/     11  [facility] - Hazel Iftiinneymar Derobddlidya Fogaanta: 2.09 miles      Qof [facility]   [address] [place] [address]  Luuqad: Essie Villaseñor: Iskathinta - Jimthor 4:00 PM - 9:00 AM  Khcarlos manuelkaykayyada: Bilaash   Phone: [phone] Email: adriana@[email or web address].org Website: https://centralusa.salvationarmy.org/[facility] Hospitaler/          Piero wolfe & Mita Madrigal   911  Adeegyada Magaalada   311  Kontoroolneymar Sunta   [phone]  Lifeline ka moe iseleonora   [phone] (TALK)  Shakila wolfe ee Xadgsean Caruurta   [phone] (4-A-Child)  Shakila tamez Galmada   [phone] (HOPE)  [employer]   [phone] (RUNAWAY)  Shakila Estrada   [phone]  Belem Younger   [phone] (HELP)

## 2023-12-21 NOTE — PROGRESS NOTES
ADDENDUM:  Echocardiogram completed today 1/3, limited due to patient declining IV contrast and technically difficult study, but there was no major valvular abnormality, LV systolic function is normal with EF 65-70%. Mild concentric LVH, likely in setting of her hypertension. RWMA unable to be excluded but patient denies any chest pain with exertion and thus I have a low suspicion for this. She does not require CAD/ischemia evaluation.    Required diagnostic evaluation (echocardiogram) is now complete and approval is given to proceed with surgery as scheduled tomorrow . Will have care team fax this addended note to [phone], ATTN to: Alfonso.    Also called and discussed blood pressures with patient and since starting amlodipine 10mg, they have been 120s-130s/70s at home. Given this, would recommend that she STOP losartan (do not take on day of surgery) due to risk of hypotension with anesthesia. She is in agreement with this plan.    Luis Pham MD  Family Medicine with Obstetrics  [facility]  24  2:52 PM      Echo results:  [facility]  Echocardiography Laboratory  [address] [place] [address]     Name: CATARINO GUZMAN  MRN: [id number]  : 1960  Study Date: 2024 07:34 AM  Age: 63 yrs  Gender: Female  Patient Location: [facility]  Reason For Study: HTN, goal below 140/90  Ordering Physician: ASHLEE PHAM  Referring Physician: ASHLEE PHAM  Performed By: Elizabeth Willett     BSA: 2.2 m2  Height: 67 in  Weight: 257 lb  HR: 85  BP: 136/85 mmHg  ______________________________________________________________________________  Procedure  Complete Echo Adult. Technically difficult study.  ______________________________________________________________________________  Interpretation Summary     Left ventricular systolic function is normal.  The visual ejection fraction is 65-70%.  Regional wall motion  abnormalities cannot be excluded due to limited  visualization.  Patient declined IV contrast.  The study was technically difficult. There is no comparison study available.  ______________________________________________________________________________  Left Ventricle  The left ventricle is normal in size. There is mild concentric left  ventricular hypertrophy. Left ventricular systolic function is normal. The  visual ejection fraction is 65-70%. Left ventricular diastolic function is  indeterminate. Regional wall motion abnormalities cannot be excluded due to  limited visualization.     Right Ventricle  The right ventricle is normal size. The right ventricular systolic function is  normal.     Atria  Normal left atrial size. Right atrial size is normal.     Mitral Valve  There is trace mitral regurgitation.     Tricuspid Valve  No tricuspid regurgitation. Right ventricular systolic pressure could not be  approximated due to inadequate tricuspid regurgitation.     Aortic Valve  There is mild trileaflet aortic sclerosis. No aortic stenosis is present.     Pulmonic Valve  The pulmonic valve is not well visualized.     Vessels  The aortic root is normal size.     Pericardium  There is no pericardial effusion.     Rhythm  Sinus rhythm was noted.  ______________________________________________________________________________  MMode/2D Measurements & Calculations  IVSd: 1.3 cm     LVIDd: 4.2 cm  LVIDs: 2.8 cm  LVPWd: 1.2 cm  FS: 33.3 %  LV mass(C)d: 184.5 grams  LV mass(C)dI: 82.0 grams/m2  Ao root diam: 3.4 cm  asc Aorta Diam: 3.8 cm  LVOT diam: 2.0 cm  LVOT area: 3.3 cm2  Ao root diam index Ht(cm/m): 2.0  Ao root diam index BSA (cm/m2): 1.5  Asc Ao diam index BSA (cm/m2): 1.7  Asc Ao diam index Ht(cm/m): 2.2  LA Volume (BP): 53.6 ml     LA Volume Index (BP): 23.8 ml/m2  RV Base: 3.2 cm  RWT: 0.56  TAPSE: 2.5 cm     Doppler Measurements & Calculations  MV E max ritu: 68.6 cm/sec  MV A max ritu: 83.9 cm/sec  MV E/A:  0.82  MV max P.4 mmHg  MV mean P.1 mmHg  MV V2 VTI: 19.4 cm  MVA(VTI): 4.0 cm2  MV dec slope: 270.6 cm/sec2  MV dec time: 0.25 sec  Ao V2 max: 148.5 cm/sec  Ao max P.0 mmHg  Ao V2 mean: 102.5 cm/sec  Ao mean P.0 mmHg  Ao V2 VTI: 28.2 cm  BLESSING(I,D): 2.8 cm2  BLESSING(V,D): 2.8 cm2  LV V1 max P.4 mmHg  LV V1 max: 126.0 cm/sec  LV V1 VTI: 23.8 cm  SV(LVOT): 77.7 ml  SI(LVOT): 34.5 ml/m2  PA acc time: 0.10 sec  AV Lexx Ratio (DI): 0.85  BLESSING Index (cm2/m2): 1.2     E/E' av.6  Lateral E/e': 5.8  Medial E/e': 9.4  RV S Lexx: 17.2 cm/sec     ______________________________________________________________________________  Report approved by: Sebastian Bruner 2024 01:15 PM       [facility] [address] [place] [address]  Phone: [phone]  Fax: [phone]  Primary Provider: Ashlee Pham  Pre-op Performing Provider:    ASHLEE PHAM  VIDEO,       PREOPERATIVE EVALUATION:  Today's date: 2023    Jethro is a 63 year old, presenting for the following:  Pre-Op Exam (Hysterectomy )        2023     2:53 PM   Additional Questions   Roomed by Elmer MCDONALD   Accompanied by Son       Surgical Information:  Surgery/Procedure: ROBOTIC ASSISTED LAPAROSCOPIC HYSTERECTOMY, BILATERAL SALPINGO OOPHORECTOMY   Surgery Location: [facility]   Surgeon: Irina Christopher MD   Surgery Date: 2023 at   Time of Surgery: 10:48AM  Where patient plans to recover: At home with family  Fax number for surgical facility: [phone]     Assessment & Plan     The proposed surgical procedure is considered INTERMEDIATE risk.    Preop general physical exam  Postmenopausal bleeding  Hyperplasia of endometrium determined by biopsy  64yo female with a history of postmenopausal bleeding with diagnosis of stage IIA endocervical adenocarcinoma (diagnosed in [place]) but workup here remarkable for complex  papillary glandular hyperplasia on EMB but hysteroscopic D&C and ECC negative. Plan is for robotic assisted laparoscopic hysterectomy/BLSO on 12/29 for more definitive diagnosis.  She has a history of prediabetes (not diabetes), CKD stage 3B likely due to hypertension, hypertension. She has no personal or family history of anesthesia reaction, bleeding disorder, or clotting disorder. She has good exercise tolerance (able to climb one flight of stairs). Her blood pressure is not wellcontrolled today (medications recently changed), so we will start a new medication to try to bring this under better control prior to procedure. She also has a systolic murmur noted on exam. EKG obtained today with nonspecific ST-T changes (t wave flattening and inversion in anterolateral leads) and rare PVC. Will plan to obtain echo prior to procedure. EKG not concerning for active cardiac ischemia at this time - denies any chest pain.  - CBC w/ Reflex to Iron Studies; Future  - CBC w/ Reflex to Iron Studies      HTN, goal below 140/90  BP elevated today. Was previously controlled on candesartan/hydrochlorothiazide but she was having frequent urination and incontinence with this medication. She does not appear volume overloaded and thus does not have another indication for a diuretic. She will start amlodipine 10mg daily in addition to losartan 100mg daily. She will check her Bps at home and keep track of them.  - amLODIPine (NORVASC) 10 MG tablet; Take 1 tablet (10 mg) by mouth daily  - Echocardiogram Complete; Future  - EKG 12-lead, tracing only    Stage 3b chronic kidney disease (CKD) (H)  GFR 35-45, stable. + microalbumin. Likely due to hypertension.     Advanced directives, counseling/discussion  Discussed today, she desires son to be health care agent. Given [employer] health care directive to fill out.    Mixed stress and urge urinary incontinence  Improved since stopping hydrochlorothiazide. She did have a urine culture  with 50-100K Strep dysgalactiae but denies any symptoms of UTI so will not treat.    Systolic murmur  Noted on exam. No symptoms. However, given planned surgery, will obtain echo.    History of stroke  Patient reports a history of stroke but her history (see below) is not consistent with CVA. She did not have head imaging at the time of the stroke and it sounds like she may have had hypertensive emergency instead (BP was very high).       Risks and Recommendations:  The patient has the following additional risks and recommendations for perioperative complications:  Morbid obesity  Cardiovascular:   - New systolic murmur auscultated on exam; referred for echocardiogram    Antiplatelet or Anticoagulation Medication Instructions:   - Patient is on no antiplatelet or anticoagulation medications.    Additional Medication Instructions:   - ACE/ARB: Continue without modification (e.g., MAC anesthesia, neurosurgery, spine surgery, heart failure, or labile hypertension with risk of hypertension). Given elevated Bps today, I am concerned that if we stopped losartan, her BP would be very high on day of surgery. Will plan for her to continue on day of surgery.   - Calcium Channel Blockers: May be continued on the day of surgery.    RECOMMENDATION:  APPROVAL GIVEN to proceed with proposed procedure pending review of diagnostic evaluation.      45 minutes spent by me on the date of the encounter doing chart review, history and exam, documentation and further activities per the note      Subjective       HPI related to upcoming procedure:     #Hypertension  - taking BP at home, upper number is in 140s/70-80s they think  - taking losartan, just started taking 100mg today    #Urinary symptoms  - urinates a lot, no dysuria; less since stopping hydrochlorothiazide    #Stroke  - around August 2016  - in [place]  - slept on right hand; woke up with puffiness in right face; was having weakness in right arm and leg  - blood pressure was  very high, it came down, and then everything was better  - did not do MRI or CT scan  - did not start aspirin    Went to [place] 2017 and they checked everything and gave her a blood pressure medication    #Murmur  - never told she had a murmur before  - no dyspnea on exertion, no dyspnea at rest, no orthopnea, PND, no chest pain  - some slight swelling in her legs but not worse since stopping hydrochlorothiazide  - can climb one flight of stairs without chest pain or shortness of breath        12/21/2023     2:46 PM   Preop Questions   1. Have you ever had a heart attack or stroke? YES - see above   2. Have you ever had surgery on your heart or blood vessels, such as a stent placement, a coronary artery bypass, or surgery on an artery in your head, neck, heart, or legs? No   3. Do you have chest pain with activity? No   4. Do you have a history of  heart failure? No   5. Do you currently have a cold, bronchitis or symptoms of other infection? No   6. Do you have a cough, shortness of breath, or wheezing? No   7. Do you or anyone in your family have previous history of blood clots? No   8. Do you or does anyone in your family have a serious bleeding problem such as prolonged bleeding following surgeries or cuts? No   9. Have you ever had problems with anemia or been told to take iron pills? No   10. Have you had any abnormal blood loss such as black, tarry or bloody stools, or abnormal vaginal bleeding? No   11. Have you ever had a blood transfusion? No   12. Are you willing to have a blood transfusion if it is medically needed before, during, or after your surgery? Yes   13. Have you or any of your relatives ever had problems with anesthesia? No   14. Do you have sleep apnea, excessive snoring or daytime drowsiness? No   15. Do you have any artifical heart valves or other implanted medical devices like a pacemaker, defibrillator, or continuous glucose monitor? No   16. Do you have artificial joints? No   17. Are  you allergic to latex? No       Health Care Directive:  Patient does not have a Health Care Directive or Living Will: Discussed advance care planning with patient; information given to patient to review. Patient would like her son to be her health care agent.    Preoperative Review of :   reviewed - no record of controlled substances prescribed. Patient was prescribed oxycodone for surgery.          Review of Systems  CONSTITUTIONAL: NEGATIVE for fever, chills, change in weight  INTEGUMENTARY/SKIN: NEGATIVE for worrisome rashes, moles or lesions  EYES: NEGATIVE for vision changes or irritation  ENT/MOUTH: NEGATIVE for ear, mouth and throat problems  RESP: NEGATIVE for significant cough or SOB  CV: NEGATIVE for chest pain, palpitations or peripheral edema  GI: NEGATIVE for nausea, abdominal pain, heartburn, or change in bowel habits   female: frequency, incontinence-urge, and incontinence-stress  MUSCULOSKELETAL: NEGATIVE for significant arthralgias or myalgia  NEURO: NEGATIVE for weakness, dizziness or paresthesias  ENDOCRINE: NEGATIVE for temperature intolerance, skin/hair changes  HEME: NEGATIVE for bleeding problems  PSYCHIATRIC: NEGATIVE for changes in mood or affect    Patient Active Problem List    Diagnosis Date Noted    Mixed stress and urge urinary incontinence 11/16/2023     Priority: Medium    Class 3 severe obesity due to excess calories without serious comorbidity with body mass index (BMI) of 40.0 to 44.9 in adult (H) 11/16/2023     Priority: Medium    Stage 3b chronic kidney disease (CKD) (H)      Priority: Medium    Vitamin D deficiency      Priority: Medium    Advanced directives, counseling/discussion 07/21/2014     Priority: Medium     Discussed with pt, she does not have one and is not interested at this time. 7/21/14      Arthritis of knee, right      Priority: Medium    Arthritis of knee, left      Priority: Medium    HTN, goal below 140/90      Priority: Medium    Tail bone pain       "Priority: Medium    GERD (gastroesophageal reflux disease)      Priority: Medium    Constipation      Priority: Medium      Past Medical History:   Diagnosis Date    Arthritis of knee, left     Arthritis of knee, right     CKD (chronic kidney disease) stage 3, GFR 30-59 ml/min (H)     Constipation     GERD (gastroesophageal reflux disease)     HTN, goal below 140/90     Tail bone pain     Vitamin D deficiency      History reviewed. No pertinent surgical history.  Current Outpatient Medications   Medication Sig Dispense Refill    losartan (COZAAR) 100 MG tablet Take 1 tablet (100 mg) by mouth daily 90 tablet 1    oxyCODONE (ROXICODONE) 5 MG tablet Take 5 mg by mouth every 6 hours as needed for pain         Allergies   Allergen Reactions    Apple Flavor [Flavoring Agent] Itching     Itching throat         Social History     Tobacco Use    Smoking status: Never     Passive exposure: Never    Smokeless tobacco: Never   Substance Use Topics    Alcohol use: No     Family History   Problem Relation Age of Onset    Hypertension Father      History   Drug Use No         Objective     BP (!) 178/98   Pulse 76   Temp 97.6  F (36.4  C) (Oral)   Ht 1.725 m (5' 7.91\")   Wt 116.6 kg (257 lb 1.6 oz)   SpO2 99%   BMI 39.19 kg/m      Wt Readings from Last 3 Encounters:   12/21/23 116.6 kg (257 lb 1.6 oz)   11/16/23 115.3 kg (254 lb 1.9 oz)   09/11/14 115.7 kg (255 lb)       Physical Exam    GENERAL APPEARANCE: healthy, alert and no distress     EYES: EOMI, PERRL     HENT: ear canals and TM's normal and nose and mouth without ulcers or lesions     NECK: no adenopathy, no asymmetry, masses, or scars and thyroid normal to palpation     RESP: lungs clear to auscultation - no rales, rhonchi or wheezes     CV: regular rates and rhythm, normal S1 S2, no S3 or S4, and grade II/6 systolic murmur heard best over the RUSB, trace nonpitting edema bilateral lower legs to ankles     ABDOMEN:  soft, nontender, no HSM or masses and bowel " sounds normal     MS: extremities normal- no gross deformities noted, no evidence of inflammation in joints, FROM in all extremities.     SKIN: no suspicious lesions or rashes     NEURO: Normal strength and tone, sensory exam grossly normal, mentation intact and speech normal     PSYCH: mentation appears normal. and affect normal/bright     LYMPHATICS: No cervical adenopathy    Recent Labs   Lab Test 12/18/23  0937 11/16/23  1043   HGB  --  12.3   PLT  --  259    139   POTASSIUM 4.0 3.9   CR 1.58* 1.95*   A1C  --  5.9*        Diagnostics:  Recent Results (from the past 168 hour(s))   Basic metabolic panel  (Ca, Cl, CO2, Creat, Gluc, K, Na, BUN)    Collection Time: 12/18/23  9:37 AM   Result Value Ref Range    Sodium 138 135 - 145 mmol/L    Potassium 4.0 3.4 - 5.3 mmol/L    Chloride 105 98 - 107 mmol/L    Carbon Dioxide (CO2) 22 22 - 29 mmol/L    Anion Gap 11 7 - 15 mmol/L    Urea Nitrogen 22.3 8.0 - 23.0 mg/dL    Creatinine 1.58 (H) 0.51 - 0.95 mg/dL    GFR Estimate 36 (L) >60 mL/min/1.73m2    Calcium 9.3 8.8 - 10.2 mg/dL    Glucose 99 70 - 99 mg/dL   Urine Culture Aerobic Bacterial    Collection Time: 12/18/23 10:24 AM    Specimen: Urine, NOS   Result Value Ref Range    Culture >100,000 CFU/mL Urogenital romero     Culture (A)      50,000-100,000 CFU/mL Streptococcus dysgalactiae (Group C/G Streptococcus)      EKG required for murmur and not completed in the last 90 days.   EKG: appears normal, NSR, no LVH by voltage criteria, occasional PVC noted, unifocal, nonspecific ST-T changes - t wave flattening and inversion in anterolateral leads, there are no prior tracings available    Revised Cardiac Risk Index (RCRI):  The patient has the following serious cardiovascular risks for perioperative complications:   - No serious cardiac risks = 0 points     RCRI Interpretation: 0 points: Class I (very low risk - 0.4% complication rate)         Signed Electronically by: Ratna Pham MD  Copy of this  evaluation report is provided to requesting physician.

## 2023-12-21 NOTE — PATIENT INSTRUCTIONS
Preparing for Your Surgery  Getting started  A nurse will call you to review your health history and instructions. They will give you an arrival time based on your scheduled surgery time. Please be ready to share:  Your doctor's clinic name and phone number  Your medical, surgical, and anesthesia history  A list of allergies and sensitivities  A list of medicines, including herbal treatments and over-the-counter drugs  Whether the patient has a legal guardian (ask how to send us the papers in advance)  Please tell us if you're pregnant--or if there's any chance you might be pregnant. Some surgeries may injure a fetus (unborn baby), so they require a pregnancy test. Surgeries that are safe for a fetus don't always need a test, and you can choose whether to have one.   If you have a child who's having surgery, please ask for a copy of Preparing for Your Child's Surgery.    Preparing for surgery  Within 10 to 30 days of surgery: Have a pre-op exam (sometimes called an H&P, or History and Physical). This can be done at a clinic or pre-operative center.  If you're having a , you may not need this exam. Talk to your care team.  At your pre-op exam, talk to your care team about all medicines you take. If you need to stop any medicines before surgery, ask when to start taking them again.  We do this for your safety. Many medicines can make you bleed too much during surgery. Some change how well surgery (anesthesia) drugs work.  Call your insurance company to let them know you're having surgery. (If you don't have insurance, call [phone].)  Call your clinic if there's any change in your health. This includes signs of a cold or flu (sore throat, runny nose, cough, rash, fever). It also includes a scrape or scratch near the surgery site.  If you have questions on the day of surgery, call your hospital or surgery center.  Eating and drinking guidelines  For your safety: Unless your surgeon tells you otherwise,  follow the guidelines below.  Eat and drink as usual until 8 hours before you arrive for surgery. After that, no food or milk.  Drink clear liquids until 2 hours before you arrive. These are liquids you can see through, like water, Gatorade, and Propel Water. They also include plain black coffee and tea (no cream or milk), candy, and breath mints. You can spit out gum when you arrive.  If you drink alcohol: Stop drinking it the night before surgery.  If your care team tells you to take medicine on the morning of surgery, it's okay to take it with a sip of water.  Preventing infection  Shower or bathe the night before and morning of your surgery. Follow the instructions your clinic gave you. (If no instructions, use regular soap.)  Don't shave or clip hair near your surgery site. We'll remove the hair if needed.  Don't smoke or vape the morning of surgery. You may chew nicotine gum up to 2 hours before surgery. A nicotine patch is okay.  Note: Some surgeries require you to completely quit smoking and nicotine. Check with your surgeon.  Your care team will make every effort to keep you safe from infection. We will:  Clean our hands often with soap and water (or an alcohol-based hand rub).  Clean the skin at your surgery site with a special soap that kills germs.  Give you a special gown to keep you warm. (Cold raises the risk of infection.)  Wear special hair covers, masks, gowns and gloves during surgery.  Give antibiotic medicine, if prescribed. Not all surgeries need antibiotics.  What to bring on the day of surgery  Photo ID and insurance card  Copy of your health care directive, if you have one  Glasses and hearing aids (bring cases)  You can't wear contacts during surgery  Inhaler and eye drops, if you use them (tell us about these when you arrive)  CPAP machine or breathing device, if you use them  A few personal items, if spending the night  If you have . . .  A pacemaker, ICD (cardiac defibrillator) or other  implant: Bring the ID card.  An implanted stimulator: Bring the remote control.  A legal guardian: Bring a copy of the certified (court-stamped) guardianship papers.  Please remove any jewelry, including body piercings. Leave jewelry and other valuables at home.  If you're going home the day of surgery  You must have a responsible adult drive you home. They should stay with you overnight as well.  If you don't have someone to stay with you, and you aren't safe to go home alone, we may keep you overnight. Insurance often won't pay for this.  After surgery  If it's hard to control your pain or you need more pain medicine, please call your surgeon's office.  Questions?   If you have any questions for your care team, list them here: _________________________________________________________________________________________________________________________________________________________________________ ____________________________________ ____________________________________ ____________________________________  For informational purposes only. Not to replace the advice of your health care provider. Copyright   2003, 2019 [facility]. All rights reserved. Clinically reviewed by Chandrika Nolasco MD. [employer] 280410 - REV 12/22.    How to Take Your Medication Before Surgery  - Take all of your medications before surgery as usual

## 2023-12-21 NOTE — COMMUNITY RESOURCES LIST (ENGLISH)
12/21/2023   [facility]  N/A  For questions about this resource list or additional care needs, please contact your primary care clinic or care manager.  Phone: [phone]   Email: N/A   Address: [address] [place] [address]   Hours: N/A        Hotlines and Helplines       Hotline - Housing crisis  1  [facility] Distance: 0.83 miles      Phone/Virtual   [address] [place] [address]  Language: English  Hours: Mon - Sun Open 24 Hours   Phone: (726) 961-6501 Email: communications@[facility].org Website: https://oscs-[place].org/oursaviourshousing/     2  [facility] Distance: 1.56 miles      Phone/Virtual   [address] [place] [address]  Language: English  Hours: Mon - Sun Open 24 Hours  Fees: Free   Phone: (261) 895-8989 Email: info@[employer] Website: http://www.[employer]/          Housing       Coordinated Entry access point  3  [facility] [place] ([facility] Distance: 1.02 miles      In-Person   [address] [place] [address]  Language: English  Hours: Mon - Fri 9:00 AM - 5:00 PM  Fees: Free   Phone: (847) 662-4378 Email: housing@[facility].org Website: http://www.[facility].org/housing     4  [facility] Distance: 1.56 miles      In-Person   [address] [place] [address]  Language: English  Hours: Mon - Sat 10:00 PM - 5:00 PM  Fees: Free   Phone: (496) 698-7721 Email: info@[employer] Website: http://www.[facility].org/     Drop-in center or day shelter  5  [facility] Distance: 0.96 miles      In-Person   [address] [place] [address]  Language: English, Croatian, Tajik  Hours: Mon - Sat 7:00 AM - 3:00 PM  Fees: Free, Self Pay   Phone: (941) 509-7679 Email: info@[employer].org Website: https://www.[facility]ctwincities.org/locations/opportunity-center/     6  [facility]  Distance: 1.15 miles      In-Person   [address] [place] [address]  Language: English  Hours: Mon - Fri 12:00 PM - 3:00 PM  Fees: Free   Phone: (987) 266-8767 Email: camila[employer] Website: http://[facility].org/     Housing search assistance  7  [facility] ([facility]) Distance: 0.45 miles      In-Person   [address] [place] [address]  Language: English, Anguillan, Guinean  Hours: Mon - Fri 8:30 AM - 4:30 PM  Fees: Free   Phone: (687) 713-4644 Email: luisa@[facility][note 2].org Website: http://www.[facility]-[place].org/     8  [facility] Multicultural [facility] Distance: 0.89 miles      Phone/Virtual   [address] [place] [address]  Language: American Sign Language, Lao, Upper sorbian, English, Swedish, Macedonian, Oromo, American, Anguillan, Guinean, Swahili, Telugu, Turkish  Hours: Mon - Tue 9:00 AM - 4:00 PM , Wed 10:00 AM - 5:00 PM , Thu - Fri 9:00 AM - 4:00 PM  Fees: Free   Phone: (916) 357-5133 Email: oms@[facility] Website: http://www.[facility]/residents/human-services/multi-cultural-services     Shelter for families  9  [facility] Distance: 18.35 miles      In-Person   [address] [place] [address]  Language: English  Hours: Mon - Fri 3:00 PM - 9:00 AM , Sat - Sun Open 24 Hours  Fees: Free   Phone: (771) 177-9580 Ext.1 Website: https://www.[facility]s.org/2020/07/03/emergency-family-shelter/     Shelter for individuals  10  [facility] Distance: 0.83 miles      In-Person   [address] [place] [address]  Language: English  Hours: Mon - Sun Open 24 Hours  Fees: Free   Phone: (755) 403-4174 Email: communications@[facility].org Website: https://[facility]-mn.org/oursaviourshousing/     11  [facility] Distance: 2.09 miles      In-Person   [address] [place] [address]  Language: English  Hours: Mon - Fri 4:00 PM - 9:00 AM  Fees: Free    Phone: (792) 434-9621 Email: adriana@[facility].[employer].org Website: https://[facility].org/[facility]/          Important Numbers & Websites       Emergency Services   911  [facility] Services   311  Poison Control   (974) 585-6676  Suicide Prevention Lifeline   (748) 626-5960 (TALK)  Child Abuse Hotline   (331) 879-4958 (4-A-Child)  Sexual Assault Hotline   (170) 460-5580 (HOPE)  National Runaway Safeline   (195) 156-5593 (RUNAWAY)  All-Options Talkline   (535) 787-2623  Substance Abuse Referral   (500) 459-1229 (HELP)

## 2023-12-22 LAB — FERRITIN SERPL-MCNC: 89 NG/ML (ref 11–328)

## 2023-12-26 ENCOUNTER — APPOINTMENT (OUTPATIENT)
Dept: INTERPRETER SERVICES | Facility: CLINIC | Age: 63
End: 2023-12-26
Payer: MEDICAID

## 2023-12-26 ENCOUNTER — TELEPHONE (OUTPATIENT)
Dept: FAMILY MEDICINE | Facility: CLINIC | Age: 63
End: 2023-12-26
Payer: MEDICAID

## 2023-12-26 NOTE — TELEPHONE ENCOUNTER
----- Message from Ratna Pham MD sent at 12/22/2023  1:10 PM CST -----  Can you please call patient and let her know blood counts are normal? EKG showed some changes that could be related to the murmur I heard on her exam so I think it is definitely important she has this scheduled prior to surgery. Please ask if she was able to get this scheduled and if not, if we can help coordinate. Thanks!

## 2023-12-26 NOTE — TELEPHONE ENCOUNTER
I placed an order for echo at her visit on 12/21 - would you be able to assist patient in making appointment for echocardiogram. She is supposed to have surgery on 1/2 and needs it done before then. Please let me know if a different order is needed.  Luis Pham MD  Family Medicine with Obstetrics  [facility]  12/26/23  3:12 PM

## 2023-12-26 NOTE — TELEPHONE ENCOUNTER
Called pt and relayed message. Pt verbalized understanding. Pt stated that she has not gotten anything scheduled yet. Pt stated she needs a referral in order to scheduled an appt.      Duarte Hannon Cem Say, BSN RN  [facility]

## 2023-12-28 ENCOUNTER — PATIENT OUTREACH (OUTPATIENT)
Dept: CARE COORDINATION | Facility: CLINIC | Age: 63
End: 2023-12-28
Payer: MEDICAID

## 2023-12-28 NOTE — PROGRESS NOTES
Clinic Care Coordination Contact  Patient has completed all goals with Clinic Care Coordination.  Please review the chart and confirm if maintenance/graduate is approved.    -Patient's health insurance is active with [place], no PMAP plan yet at this time and has Medicare Part A & B.  -Patient is receiving County benefits and no additional resources or coordination assistance needed at this time.  -Patient is also receiving full support from family members and will continue attending specialists' and PCP appointments as scheduled.   -Patient was informed to call with questions or concerns.     [place] Department of Human [place]: [place] Health Care Programs Eligibility Response (271)  SUBSCRIBER INFORMATION   Date of Service Subscriber ID Subscriber Name Birthdate Age Gender   12/28/2023 [id number] CATARINO GUZMAN 1960 63 FEMALE          Address   [address] [place] [address]  [id number]          PROVIDER INFORMATION   Provider ID Submitter Transaction ID Provider Name Taxonomy Code Qualifier Taxonomy Code   [id number] xx [facility]   This subscriber has eligibility for [place]: Medical Assistance.  Elig Type EX: Over age 65  Eligibility Begin Date: 10/01/2023  Eligibility End Date: --/--/----  This subscriber is eligible for the following service types: Medical Care ,  Chiropractic ,  Dental Care ,  Hospital ,  Hospital - Inpatient ,  Hospital - Outpatient ,  Emergency Services ,  Pharmacy ,  Professional (Physician) Visit - Office ,  Vision (Optometry) ,  Mental Health ,  Urgent Care   Prepaid Health Plan   None       Other Eligibility Information   No Special Transportation.  This subscribers eligibility is not determined for Long term Care and waiver services.  No Hospice.  Refer to Health Care Programs and Services Overview of the [facility] Provider Manual for a list of covered services.   Waivers   None     Subscriber Responsibility Information   An office  visit copay of 3 dollars may exist for this subscriber.  A copay of 3.50 dollars for Non-Emergency ER visits may exist for this subscriber.  A monthly family deductible of $3.80 may exist for this subscriber.  A remaining family deductible of $0.00 exists for this subscriber.   Restricted Recipient Program   None       Medicare Medicare ID: [id number]   Part A Effective Date:  01/01/2023   Part B Effective Date:        Benefit Limits   Vision     Benefits: Benefit Used:     Last eyeglasses 07/17/2014

## 2023-12-29 ENCOUNTER — TELEPHONE (OUTPATIENT)
Dept: FAMILY MEDICINE | Facility: CLINIC | Age: 63
End: 2023-12-29
Payer: MEDICAID

## 2023-12-29 NOTE — TELEPHONE ENCOUNTER
Pt's daughter returned call. She stated they do not have any available appt for pt before her surgery. They would like advise on what they should do next.        Duarte Hannon Cem Say, BSN RN  [facility]

## 2023-12-29 NOTE — TELEPHONE ENCOUNTER
Called pt's daughter and relayed message. Daughter is upset and frustrated stating that she's been on the phone for 5 hours calling around to get this situation straightened out.    The daughter stated that she called the surgeon and they told her they are not sure when the surgery will be; it might not be until 2 weeks. Pt's daughter stated that it is very important for them to keep the current surgery date and time. They would like to know if there is other options to get the ECHO done; such as getting it at the hospital or ER? Please advise.      Duarte Hannon Cem Say, BSN RN  [facility]

## 2023-12-29 NOTE — TELEPHONE ENCOUNTER
Per chart review, Dr. Marcano stated that ECHO appt should be done before her surgery on 1/2/23.    Called daughter and informed her that Echo appt should be before surgery. Advised daughter to call clinic and reschedule. Daughter verbalized understanding and stated she will call cardiology right away.      Duarte Kearns, BSN RN  [facility]

## 2023-12-29 NOTE — TELEPHONE ENCOUNTER
I understand that it is very frustrating and I really do apologize for the inconvenience. I think that unfortunately it is unlikely that we will find another place where she can get an echo sooner. I would not recommend presenting to the hospital/ER for an echo because while I do understand they want to get the surgery done as soon as possible, it will be much easier to have it done as an outpatient. I did send a message to the surgeon letting them know the situation and asking their office to reach out to the patient. I am sorry that I am unable to call the patient directly myself right now.  Thank you for your help!  Luis Pham MD  Family Medicine with Obstetrics  [facility]  12/29/23  3:53 PM

## 2023-12-29 NOTE — TELEPHONE ENCOUNTER
Her surgeon reached out to me through Epic messaging and said that they are planning to reschedule her surgery. She should keep the echo as it is scheduled and they should receive a call from the surgeon's office; if they don't, they should reach out to the surgeon.  I was also just thinking that it would be nice to have her come back for a BP check prior to her surgery. She has a BP cuff at home so if she could bring in a log of her Bps at home and her cuff to the visit, that would be great. It can either be an RN visit or with me (I don't have anything available but if needed you could double book).  Thanks!  Luis Pham MD  Family Medicine with Obstetrics  [facility]  12/29/23  3:21 PM

## 2023-12-29 NOTE — TELEPHONE ENCOUNTER
A user error has taken place: encounter opened in error, closed for administrative reasons. See 12/26/23 MIMI Kearns, BSN RN  [facility]

## 2023-12-29 NOTE — TELEPHONE ENCOUNTER
Called pt and relayed message. Pt verbalized understanding and will wait for the surgeon team to reach out to pt.      Duarte Kearns, BSN RN  [facility]

## 2023-12-29 NOTE — TELEPHONE ENCOUNTER
Patient's daughter calling clinic back as patient is scheduled for surgery on 1/2/24.  Patient has her ECHO scheduled on 1/3/24 which is suppose to be scheduled prior to surgery.  Patient's daughter has made multiple attempts to contact [facility] to see if ECHO can be done today or if it can be done after her surgery?  Patient's PCP, Dr Ratna Pham is out of clinic today.     Message sent to covering provider, Dr Anderson for review / plan.  Please send message back to [facility] staff.    Nora Boswell RN  [facility]

## 2024-01-02 ENCOUNTER — TELEPHONE (OUTPATIENT)
Dept: FAMILY MEDICINE | Facility: CLINIC | Age: 64
End: 2024-01-02

## 2024-01-03 ENCOUNTER — HOSPITAL ENCOUNTER (OUTPATIENT)
Dept: CARDIOLOGY | Facility: CLINIC | Age: 64
Discharge: HOME | End: 2024-01-03
Attending: FAMILY MEDICINE | Admitting: FAMILY MEDICINE
Payer: MEDICAID

## 2024-01-03 DIAGNOSIS — I10 HTN, GOAL BELOW 140/90: ICD-10-CM

## 2024-01-03 LAB — LVEF ECHO: NORMAL

## 2024-01-03 PROCEDURE — 93306 TTE W/DOPPLER COMPLETE: CPT

## 2024-01-03 PROCEDURE — 93306 TTE W/DOPPLER COMPLETE: CPT | Mod: 26 | Performed by: INTERNAL MEDICINE

## 2024-01-03 NOTE — TELEPHONE ENCOUNTER
Echo results just received and reviewed. Note from 12/21 addended. Sending to care team to please print and fax to below number. Thank you!  Luis Pham MD  Family Medicine with Obstetrics  [facility]  01/03/24  2:39 PM

## 2024-01-03 NOTE — TELEPHONE ENCOUNTER
Alfonso calling from Surgical Dept at [facility]. Please ask PCP to review EHCO and make an addendum to Pre-op notes with Ehco results. Send new Notes (addendum pre op) along with labs to [facility] Attn: Surgical Team at Fx# [phone] No later than early tomorrow morning. Pt is scheduled to arrive at 11:00 am on 01/04/24 for surgery. Pt surgery will be cancelled if addend notes and labs are not received before Pt's appt.

## 2024-01-03 NOTE — TELEPHONE ENCOUNTER
Patient is scheduled for echo on 1/3 and for surgery on 1/4. Will need to confirm echo read and relay to surgeon before surgery on 1/4.

## 2024-01-03 NOTE — TELEPHONE ENCOUNTER
Alfonso  calls back inquiring about status of ECHO result request. Echo, lab, and preop notes will need to be addend to that patient is clear for surgery. Please fax requested documents to [phone], make ATTN to: Alfonso. Writer inform caller message was sent to provider- awaiting for response. Writer will send provider another message. Caller verbalizes understanding.     Kati Ruff,   [facility]  January 3, 2024 1:02 PM

## 2024-01-04 ENCOUNTER — PATIENT OUTREACH (OUTPATIENT)
Dept: CARE COORDINATION | Facility: CLINIC | Age: 64
End: 2024-01-04
Payer: MEDICAID

## 2024-01-04 NOTE — PROGRESS NOTES
Clinic Care Coordination Contact  Program: insurance   [facility] Case #:  [facility] Worker:   [place] #:   Subscriber #:   Renewal:  Date Applied:     FRW Outreach:   1/4/24 FRW received message form CH[facility] patient insurance is active and it has been added to the chart FRW is closing program.   Bette Willett   Financial Resource Worker  [facility] Care Coordination  [phone]    12/21/23  FRW called patient and left a vm with call back information. FRW will make outreach in one to two weeks.  Bette Willett   Financial Resource Worker  [facility]  [phone]    11/20/23 FRW called and patient stated that she just got back in the country from [place] and FRW filled out a paper application and mailed it to the patient for her to sign and send in. FRW will follow up in 30 days   Bette Willett   Financial Resource Worker  [facility] Care Coordination  [phone]     Health Insurance Screening: [place]   1. Do you currently have health insurance, did you receive a renewal? no  2. If you applied through M[employer]ure, do you know your username/password? No  3. How many people in the household? 1  4. Do you file taxes, who do you file with?  No   5. What is your monthly income (include all tax members)? 830. SSI  6. Do you have access to insurance through an employer (if yes, need EIN)? No   7. Do you have social security cards and/or green cards?  Yes     Health Insurance:  This subscriber has eligibility for [place]: Medical [place].  Elig Type EX: Over age 65  Eligibility Begin Date: 10/01/2023  Eligibility End Date: --/--/----  This subscriber is eligible for the following service types: Medical Care ,  Chiropractic ,  Dental Care ,  Hospital ,  Hospital - Inpatient ,  Hospital - Outpatient ,  Emergency Services ,  Pharmacy ,  Professional (Physician) Visit - Office ,  Vision (Optometry) ,  Mental Health ,  Urgent  Care    Referral/Screening:   FRW Screening    Row Name 11/16/23 1447       [facility] Benefits   Is patient requesting help applying for [facility] benefits? No       Insurance:   [place]? Yes       Is this an insurance renewal? No       Is this a new insurance application request? Yes       Have you recently applied for insurance? No       How many people in your household? 1       Do you file taxes? No       What is the monthly gross income for the household (wages, social security, workers comp, and pension)? 800       OTHER   Is this a elvia care application? No

## 2024-01-05 ENCOUNTER — PATIENT OUTREACH (OUTPATIENT)
Dept: CARE COORDINATION | Facility: CLINIC | Age: 64
End: 2024-01-05
Payer: MEDICAID

## 2024-01-11 ENCOUNTER — TELEPHONE (OUTPATIENT)
Dept: MAMMOGRAPHY | Facility: CLINIC | Age: 64
End: 2024-01-11
Payer: MEDICAID

## 2024-01-11 NOTE — TELEPHONE ENCOUNTER
Patient Quality Outreach    Patient is due for the following:   Breast Cancer Screening - Mammogram    Next Steps:   Patient was scheduled for mammo    Type of outreach:    Phone, spoke to patient/parent. 1/16 at 10:00    Next Steps:  Reach out within 90 days via Phone.    Max number of attempts reached: No. Will try again in 90 days if patient still on fail list.    Questions for provider review:    None           AGNIESZKA Jose  Chart routed to Care Team.

## 2024-01-16 LAB
ATRIAL RATE - MUSE: 77 BPM
DIASTOLIC BLOOD PRESSURE - MUSE: NORMAL MMHG
INTERPRETATION ECG - MUSE: NORMAL
P AXIS - MUSE: 58 DEGREES
PR INTERVAL - MUSE: 162 MS
QRS DURATION - MUSE: 80 MS
QT - MUSE: 378 MS
QTC - MUSE: 427 MS
R AXIS - MUSE: 17 DEGREES
SYSTOLIC BLOOD PRESSURE - MUSE: NORMAL MMHG
T AXIS - MUSE: 120 DEGREES
VENTRICULAR RATE- MUSE: 77 BPM

## 2024-02-05 DIAGNOSIS — I10 HTN, GOAL BELOW 140/90: ICD-10-CM

## 2024-02-05 DIAGNOSIS — I10 ESSENTIAL HYPERTENSION: ICD-10-CM

## 2024-02-05 RX ORDER — LOSARTAN POTASSIUM 100 MG/1
100 TABLET ORAL DAILY
Qty: 90 TABLET | Refills: 3 | Status: SHIPPED | OUTPATIENT
Start: 2024-02-05

## 2024-02-05 RX ORDER — AMLODIPINE BESYLATE 10 MG/1
10 TABLET ORAL DAILY
Qty: 90 TABLET | Refills: 3 | Status: SHIPPED | OUTPATIENT
Start: 2024-02-05

## 2024-02-05 NOTE — TELEPHONE ENCOUNTER
Received call from patient and son whom is interpreting for patient.     They are requesting 1 year supplies of her amlodipine & losartan as she will be travelling to [place] with an unknown return date. Current scripts have refills lasting until June of this year however she intends to be out of the country much later than this.     Refills pended. Please sign if agreeable.     Selam Irwin RN BSN  [facility]

## 2024-02-05 NOTE — TELEPHONE ENCOUNTER
Approved refills. Has follow up scheduled with Dr. Pineda on 2/19.  Luis Pham MD  Family Medicine with Obstetrics  [facility]  02/05/24  3:48 PM

## 2024-02-09 ENCOUNTER — TRANSFERRED RECORDS (OUTPATIENT)
Dept: HEALTH INFORMATION MANAGEMENT | Facility: CLINIC | Age: 64
End: 2024-02-09
Payer: COMMERCIAL

## 2024-02-14 PROBLEM — N95.0 POSTMENOPAUSAL BLEEDING: Status: ACTIVE | Noted: 2023-12-05

## 2024-02-14 PROBLEM — N85.00 HYPERPLASIA OF ENDOMETRIUM DETERMINED BY BIOPSY: Status: RESOLVED | Noted: 2023-12-21 | Resolved: 2024-02-14

## 2024-06-14 ENCOUNTER — TELEPHONE (OUTPATIENT)
Dept: MAMMOGRAPHY | Facility: CLINIC | Age: 64
End: 2024-06-14
Payer: COMMERCIAL

## 2024-06-14 NOTE — TELEPHONE ENCOUNTER
Patient Quality Outreach    Patient is due for the following:   Breast Cancer Screening - Mammogram    Next Steps:   Schedule a office visit for mammo    Type of outreach:    Unable to LM, no VM set up    Next Steps:  Reach out within 90 days via Phone.    Max number of attempts reached: No. Will try again in 90 days if patient still on fail list.    Questions for provider review:    None           AGNIESZKA Jose  Chart routed to Care Team.

## 2024-10-28 ENCOUNTER — TELEPHONE (OUTPATIENT)
Dept: MAMMOGRAPHY | Facility: CLINIC | Age: 64
End: 2024-10-28
Payer: COMMERCIAL

## 2024-10-28 NOTE — TELEPHONE ENCOUNTER
Patient Quality Outreach    Patient is due for the following:   Breast Cancer Screening - Mammogram    Next Steps:   Schedule a office visit for mammo    Type of outreach:    Phone, left message for patient/parent to call back.    Next Steps:  Reach out within 90 days via Phone.    Max number of attempts reached: No. Will try again in 90 days if patient still on fail list.    Questions for provider review:    None           AGNIESZKA Jose  Chart routed to Care Team.

## 2025-01-23 ENCOUNTER — TELEPHONE (OUTPATIENT)
Dept: FAMILY MEDICINE | Facility: CLINIC | Age: 65
End: 2025-01-23
Payer: COMMERCIAL

## 2025-01-23 NOTE — TELEPHONE ENCOUNTER
Patient Quality Outreach    Patient is due for the following:   Hypertension -  BP check  Breast Cancer Screening - Mammogram  Physical Annual Wellness Visit      Topic Date Due    Diptheria Tetanus Pertussis (DTAP/TDAP/TD) Vaccine (1 - Tdap) Never done    Pneumococcal Vaccine (1 of 1 - PCV) Never done    Zoster (Shingles) Vaccine (1 of 2) Never done    Flu Vaccine (1) 09/01/2024    COVID-19 Vaccine (1 - 2024-25 season) Never done       Action(s) Taken:   Schedule a Annual Wellness Visit    Type of outreach:    No answer/ Busy. No voice mail set up to leave message.     Questions for provider review:    None           Jakob Garcia MA  Chart routed to Care Team.

## 2025-06-17 ENCOUNTER — TELEPHONE (OUTPATIENT)
Dept: FAMILY MEDICINE | Facility: CLINIC | Age: 65
End: 2025-06-17
Payer: COMMERCIAL

## 2025-06-17 NOTE — TELEPHONE ENCOUNTER
Patient Quality Outreach    Patient is due for the following:   Hypertension -  Hypertension follow-up visit  Colon Cancer Screening  Breast Cancer Screening - Mammogram  Depression  -  PHQ-9 needed  Physical Annual Wellness Visit      Topic Date Due    Diptheria Tetanus Pertussis (DTAP/TDAP/TD) Vaccine (1 - Tdap) Never done    Pneumococcal Vaccine (1 of 1 - PCV) Never done    Zoster (Shingles) Vaccine (1 of 2) Never done    COVID-19 Vaccine (1 - 2024-25 season) Never done       Action(s) Taken:   Schedule a Annual Wellness Visit    Type of outreach:    NO ANSWER NO VOICEMAIL     Questions for provider review:    None         Lisa Randolph MA  Chart routed to None.

## 2025-08-27 ENCOUNTER — TELEPHONE (OUTPATIENT)
Dept: FAMILY MEDICINE | Facility: CLINIC | Age: 65
End: 2025-08-27
Payer: COMMERCIAL